# Patient Record
Sex: FEMALE | Race: WHITE | HISPANIC OR LATINO | Employment: FULL TIME | URBAN - METROPOLITAN AREA
[De-identification: names, ages, dates, MRNs, and addresses within clinical notes are randomized per-mention and may not be internally consistent; named-entity substitution may affect disease eponyms.]

---

## 2023-01-30 ENCOUNTER — TELEPHONE (OUTPATIENT)
Dept: OBGYN CLINIC | Facility: CLINIC | Age: 23
End: 2023-01-30

## 2023-01-30 DIAGNOSIS — R73.01 ELEVATED FASTING GLUCOSE: Primary | ICD-10-CM

## 2023-01-30 NOTE — TELEPHONE ENCOUNTER
Spoke with pt in regards to her abnormal 2 HR glucose results and advised she needs to follow up with her PCP

## 2023-05-09 ENCOUNTER — APPOINTMENT (OUTPATIENT)
Dept: LAB | Facility: CLINIC | Age: 23
End: 2023-05-09

## 2023-05-09 DIAGNOSIS — Z77.21 PERSONAL HISTORY OF EXPOSURE TO POTENTIALLY HAZARDOUS BODY FLUIDS: ICD-10-CM

## 2023-05-09 DIAGNOSIS — Z01.84 IMMUNITY STATUS TESTING: ICD-10-CM

## 2023-05-09 LAB
HBV SURFACE AB SER-ACNC: 14.3 MIU/ML
HCV AB SER QL: NORMAL
HIV 1+2 AB+HIV1 P24 AG SERPL QL IA: NORMAL
HIV 2 AB SERPL QL IA: NORMAL
HIV1 AB SERPL QL IA: NORMAL
HIV1 P24 AG SERPL QL IA: NORMAL

## 2023-06-20 ENCOUNTER — APPOINTMENT (OUTPATIENT)
Dept: LAB | Facility: CLINIC | Age: 23
End: 2023-06-20

## 2023-06-20 ENCOUNTER — TRANSCRIBE ORDERS (OUTPATIENT)
Dept: LAB | Facility: CLINIC | Age: 23
End: 2023-06-20

## 2023-06-20 DIAGNOSIS — Z00.8 HEALTH EXAMINATION IN POPULATION SURVEY: ICD-10-CM

## 2023-06-20 DIAGNOSIS — Z00.8 HEALTH EXAMINATION IN POPULATION SURVEY: Primary | ICD-10-CM

## 2023-06-20 LAB
CHOLEST SERPL-MCNC: 140 MG/DL
HDLC SERPL-MCNC: 51 MG/DL
LDLC SERPL CALC-MCNC: 59 MG/DL (ref 0–100)
NONHDLC SERPL-MCNC: 89 MG/DL
TRIGL SERPL-MCNC: 152 MG/DL

## 2023-06-20 PROCEDURE — 36415 COLL VENOUS BLD VENIPUNCTURE: CPT

## 2023-06-20 PROCEDURE — 80061 LIPID PANEL: CPT

## 2023-06-20 PROCEDURE — 83036 HEMOGLOBIN GLYCOSYLATED A1C: CPT

## 2023-06-21 LAB
EST. AVERAGE GLUCOSE BLD GHB EST-MCNC: 111 MG/DL
HBA1C MFR BLD: 5.5 %

## 2023-08-01 DIAGNOSIS — T75.3XXA MOTION SICKNESS, INITIAL ENCOUNTER: Primary | ICD-10-CM

## 2023-08-02 DIAGNOSIS — T75.3XXA MOTION SICKNESS, INITIAL ENCOUNTER: Primary | ICD-10-CM

## 2023-08-02 RX ORDER — DIMENHYDRINATE 50 MG
50 TABLET ORAL DAILY PRN
Qty: 30 TABLET | Refills: 2 | Status: SHIPPED | OUTPATIENT
Start: 2023-08-02

## 2023-08-07 ENCOUNTER — DOCUMENTATION (OUTPATIENT)
Dept: DERMATOLOGY | Facility: CLINIC | Age: 23
End: 2023-08-07

## 2023-08-07 DIAGNOSIS — L30.9 ECZEMA, UNSPECIFIED TYPE: Primary | ICD-10-CM

## 2023-08-07 RX ORDER — TRIAMCINOLONE ACETONIDE 1 MG/G
OINTMENT TOPICAL 2 TIMES DAILY
Qty: 80 G | Refills: 0 | Status: SHIPPED | OUTPATIENT
Start: 2023-08-07

## 2023-12-23 ENCOUNTER — OFFICE VISIT (OUTPATIENT)
Dept: URGENT CARE | Age: 23
End: 2023-12-23
Payer: COMMERCIAL

## 2023-12-23 VITALS
OXYGEN SATURATION: 98 % | SYSTOLIC BLOOD PRESSURE: 108 MMHG | TEMPERATURE: 97.2 F | HEART RATE: 76 BPM | DIASTOLIC BLOOD PRESSURE: 66 MMHG | RESPIRATION RATE: 18 BRPM

## 2023-12-23 DIAGNOSIS — B37.31 VAGINAL CANDIDIASIS: Primary | ICD-10-CM

## 2023-12-23 LAB — SL AMB POCT URINE HCG: NORMAL

## 2023-12-23 PROCEDURE — 99213 OFFICE O/P EST LOW 20 MIN: CPT

## 2023-12-23 PROCEDURE — 81025 URINE PREGNANCY TEST: CPT

## 2023-12-23 RX ORDER — FLUCONAZOLE 150 MG/1
150 TABLET ORAL ONCE
Qty: 1 TABLET | Refills: 0 | Status: SHIPPED | OUTPATIENT
Start: 2023-12-23 | End: 2023-12-23

## 2023-12-23 NOTE — PROGRESS NOTES
[facility]        NAME: Joselito Marlow is a 23 y.o. female  : 2000    MRN: [id number]  DATE: 2023  TIME: 12:19 PM    Assessment and Plan   Vaginal candidiasis [B37.31]  1. Vaginal candidiasis  fluconazole (DIFLUCAN) 150 mg tablet    POCT urine HCG            Patient Instructions     Please take Diflucan as ordered.  If symptoms persist after use of Diflucan, please follow-up with GYN for further evaluation.  Follow up with PCP in 3-5 days.  Proceed to  ER if symptoms worsen.    Chief Complaint     Chief Complaint   Patient presents with    Vaginitis     Patient states that she started on Wednesday with a yeast infection and Thursday she started with Monstat which is not helping at this time         History of Present Illness       23-year-old female presenting for evaluation of vaginal discharge and irritation.  Patient states over the past 4 days she has been experiencing white, thick discharge, vaginal itching, redness and swelling.  She denies any change in sexual partners.  She denies concern for pregnancy at this time.  Patient denies any malodorous discharge, burning with urination, frequency, urgency, nausea, vomiting or diarrhea.  Has been using Monistat with mild relief of her symptoms.        Review of Systems   Review of Systems   Constitutional:  Negative for chills and fever.   Respiratory:  Negative for shortness of breath.    Cardiovascular:  Negative for chest pain.   Gastrointestinal:  Negative for constipation, diarrhea, nausea and vomiting.   Genitourinary:  Positive for vaginal discharge. Negative for dysuria, frequency, hematuria, urgency and vaginal bleeding.   All other systems reviewed and are negative.        Current Medications       Current Outpatient Medications:     fluconazole (DIFLUCAN) 150 mg tablet, Take 1 tablet (150 mg total) by mouth once for 1 dose, Disp: 1 tablet, Rfl: 0    dimenhyDRINATE (DRAMAMINE) 50 mg tablet, Take 1 tablet (50 mg total)  by mouth daily as needed for movement disorders, Disp: 30 tablet, Rfl: 2    Prenat-FeAsp-Meth-FA-DHA w/o A (Prenate Essential) 18-0.6-0.4-300 MG CAPS, Take 1 tablet by mouth in the morning, Disp: 30 capsule, Rfl: 11    triamcinolone (KENALOG) 0.1 % ointment, Apply topically 2 (two) times a day To the affected areas for up to 14 days. Do not apply to the face, armpits, or groin., Disp: 80 g, Rfl: 0    Current Allergies     Allergies as of 12/23/2023    (No Known Allergies)            The following portions of the patient's history were reviewed and updated as appropriate: allergies, current medications, past family history, past medical history, past social history, past surgical history and problem list.     Past Medical History:   Diagnosis Date    Diet controlled gestational diabetes mellitus (GDM) in third trimester 06/16/2022    History of intrauterine growth restriction in prior pregnancy, currently pregnant, first trimester 1/19/2022    Weighed 5 pounds at 35 weeks and 6 days    Hypertension 7/4/2018    PCOS (polycystic ovarian syndrome)     Preeclampsia, severe, third trimester 07/04/2018    with this pregnancy    Preeclampsia, third trimester 6/16/2022    Varicella     had vaccine       Past Surgical History:   Procedure Laterality Date    WISDOM TOOTH EXTRACTION Bilateral 09/07/2021       Family History   Problem Relation Age of Onset    Cancer Mother         cancerous cyst removed from LLQ - s/p hysterectomy    Asthma Mother     Sleep apnea Father     No Known Problems Sister     No Known Problems Brother     Seizures Son     Diabetes Maternal Grandmother          Medications have been verified.        Objective   /66 (BP Location: Right arm, Patient Position: Sitting, Cuff Size: Standard)   Pulse 76   Temp (!) 97.2 °F (36.2 °C)   Resp 18   SpO2 98%        Physical Exam     Physical Exam  Vitals and nursing note reviewed.   Constitutional:       General: She is not in acute distress.      Appearance: Normal appearance. She is normal weight. She is not ill-appearing, toxic-appearing or diaphoretic.   HENT:      Head: Normocephalic and atraumatic.      Right Ear: Tympanic membrane normal.      Left Ear: Tympanic membrane normal.   Eyes:      General:         Right eye: No discharge.         Left eye: No discharge.   Cardiovascular:      Rate and Rhythm: Normal rate and regular rhythm.      Pulses: Normal pulses.      Heart sounds: Normal heart sounds. No murmur heard.     No friction rub. No gallop.   Pulmonary:      Effort: Pulmonary effort is normal. No respiratory distress.      Breath sounds: Normal breath sounds. No stridor. No wheezing, rhonchi or rales.   Chest:      Chest wall: No tenderness.   Abdominal:      General: Bowel sounds are normal.      Palpations: Abdomen is soft.      Tenderness: There is no abdominal tenderness.   Skin:     General: Skin is warm and dry.   Neurological:      Mental Status: She is alert.   Psychiatric:         Mood and Affect: Mood normal.         Behavior: Behavior normal.

## 2023-12-23 NOTE — PATIENT INSTRUCTIONS
Please take Diflucan as ordered.  If symptoms persist after use of Diflucan, please follow-up with GYN for further evaluation.

## 2023-12-26 ENCOUNTER — TELEPHONE (OUTPATIENT)
Dept: OBGYN CLINIC | Facility: CLINIC | Age: 23
End: 2023-12-26

## 2023-12-26 NOTE — TELEPHONE ENCOUNTER
Patient called, left message on answering machine, she was seen at urgent care for a yeast infection, treated x 1 dose of Diflucan but no improvement. Starting to bleed, patient not sure is its from the medication or menses or PCOS related?

## 2023-12-26 NOTE — TELEPHONE ENCOUNTER
Placed call to patient, scheduled for 12/29/23 with Elizabeth.  Patient has tried Monistat 1 day and diflucan without relief. Patient works at a doctors office, urine dipped and negative for a UTI. C/O vaginal discharge and irritation, white thick discharge, vaginal itching, redness and swelling.

## 2023-12-28 NOTE — PROGRESS NOTES
Assessment/Plan:       Problem List Items Addressed This Visit       PCOS (polycystic ovarian syndrome) - Primary       No genital testing collected today since patient is currently asymptomatic and possible skewed results with active menses.  Reviewed PCOS and reassured patient that bleeding irregularities is common in PCOS. Discussed with her that 24-day cycle is actually common for women without PCOS. However, the yeast infection and changes in medication may have disrupted her HPO axis and caused vaginal spotting with earlier menses for this month.   3. Advised patient to monitor her bleeding. If bleeding continues or worsens after CD7-9, to call office for management. Consideration can be Provera, Aygestin, or TXA to mitigate bleeding. Reassured patient that bleeding duration can change in each cycle and can be longer than 4 days.   4. Counseled patient on management of menstrual irregularity r/t PCOS including BC methods, metformin, and supplements (herb-inositol). She is not interested in BC due to changes in mood, but will consider supplements.  5. Reviewed yeast prevention: Recommend acidophilus or yogurt daily, avoid irritating soaps, lubricants, or lotions, no tight fitted pants or underwear, avoid bubble baths, do not stay in wet swimsuit/moist clothing.   6. Encouraged organic coconut oil for future vulvar irritation/itching.    Subjective:      Patient ID: Joselito Marlow is a 23 y.o. female.    Patient presents to the office with vaginal and bleeding concerns. She reports she started with yeast infection symptoms of abnormal discharge and vulvar irritation on 12/20 and treated symptoms with 1-day Monistat on 12/21. She was then seen at Urgent Care on 12/23 because symptoms did not fully resolve. She was given Diflucan. After taking the medication, she reports she started with vaginal spotting. LMP prior to spotting was 12/01. On 12/25, she started a full flow. Bleeding reported as light to  moderate. She changes pads 3x/day. She reports that she still has her period on CD4 with normal flow and this is not typical for her because her bleeding usually only last 3-4 days. Prior to the irregular spotting and flow this month, her periods are irregular with occasional skipped months.  She reports that prior to vaginal spotting, her periods are irregular with occasional skipped months but never more than 3 months in a row.     She denies any continued symptoms of vaginitis with itching, burning, discharge, odor. She is sexually active. Most recent encounter approximately 1 month ago; with one partner x 6 years.     GYN history significant for PCOS.        The following portions of the patient's history were reviewed and updated as appropriate: She  has a past medical history of Diet controlled gestational diabetes mellitus (GDM) in third trimester (06/16/2022), History of intrauterine growth restriction in prior pregnancy, currently pregnant, first trimester (1/19/2022), Hypertension (7/4/2018), PCOS (polycystic ovarian syndrome), Preeclampsia, severe, third trimester (07/04/2018), Preeclampsia, third trimester (6/16/2022), and Varicella.  She   Patient Active Problem List    Diagnosis Date Noted    Daily headache 11/25/2022    PCOS (polycystic ovarian syndrome) 01/09/2020     She  has a past surgical history that includes [place] tooth extraction (Bilateral, 09/07/2021).  Her family history includes Asthma in her mother; Breast cancer in her maternal grandmother; Cancer in her mother; Diabetes in her maternal grandmother; No Known Problems in her brother and sister; Ovarian cancer in her mother; Seizures in her sister, son, and son; Sleep apnea in her father.  She  reports that she has never smoked. She has never used smokeless tobacco. She reports current alcohol use. She reports that she does not use drugs.  Current Outpatient Medications   Medication Sig Dispense Refill    dimenhyDRINATE (DRAMAMINE) 50 mg  "tablet Take 1 tablet (50 mg total) by mouth daily as needed for movement disorders 30 tablet 2    triamcinolone (KENALOG) 0.1 % ointment Apply topically 2 (two) times a day To the affected areas for up to 14 days. Do not apply to the face, armpits, or groin. 80 g 0    Prenat-FeAsp-Meth-FA-DHA w/o A (Prenate Essential) 18-0.6-0.4-300 MG CAPS Take 1 tablet by mouth in the morning 30 capsule 11     No current facility-administered medications for this visit.     Current Outpatient Medications on File Prior to Visit   Medication Sig    dimenhyDRINATE (DRAMAMINE) 50 mg tablet Take 1 tablet (50 mg total) by mouth daily as needed for movement disorders    triamcinolone (KENALOG) 0.1 % ointment Apply topically 2 (two) times a day To the affected areas for up to 14 days. Do not apply to the face, armpits, or groin.    Prenat-FeAsp-Meth-FA-DHA w/o A (Prenate Essential) 18-0.6-0.4-300 MG CAPS Take 1 tablet by mouth in the morning     No current facility-administered medications on file prior to visit.     She has No Known Allergies..    Review of Systems   Constitutional:  Negative for chills and fever.   Gastrointestinal:  Negative for abdominal pain.   Genitourinary:  Positive for menstrual problem and vaginal bleeding. Negative for dyspareunia, dysuria, flank pain, pelvic pain, vaginal discharge and vaginal pain.         Objective:      /62 (BP Location: Left arm, Patient Position: Sitting, Cuff Size: Standard)   Ht 5' 1.5\" (1.562 m)   Wt 63.1 kg (139 lb 3.2 oz)   LMP 12/01/2023 (Exact Date)   BMI 25.88 kg/m²          Physical Exam  Vitals and nursing note reviewed.   Constitutional:       Appearance: Normal appearance.   HENT:      Head: Normocephalic.      Mouth/Throat:      Pharynx: Oropharynx is clear.   Eyes:      Conjunctiva/sclera: Conjunctivae normal.   Pulmonary:      Effort: Pulmonary effort is normal. No respiratory distress.   Abdominal:      General: Abdomen is flat.      Palpations: Abdomen is soft. "   Genitourinary:     General: Normal vulva.      Exam position: Lithotomy position.      Pubic Area: No rash or pubic lice.       Labia:         Right: No rash or tenderness.         Left: No rash or tenderness.       Urethra: No urethral pain.      Vagina: Bleeding present.      Cervix: Normal.      Uterus: Normal.       Adnexa: Right adnexa normal and left adnexa normal.      Comments: Active moderate menses.  Musculoskeletal:         General: Normal range of motion.      Cervical back: Normal range of motion.   Lymphadenopathy:      Lower Body: No right inguinal adenopathy. No left inguinal adenopathy.   Skin:     General: Skin is warm and dry.   Neurological:      Mental Status: She is alert and oriented to person, place, and time.   Psychiatric:         Mood and Affect: Mood normal.         Behavior: Behavior normal.         Thought Content: Thought content normal.         Judgment: Judgment normal.

## 2023-12-29 ENCOUNTER — OFFICE VISIT (OUTPATIENT)
Dept: OBGYN CLINIC | Facility: CLINIC | Age: 23
End: 2023-12-29
Payer: COMMERCIAL

## 2023-12-29 VITALS
BODY MASS INDEX: 25.62 KG/M2 | SYSTOLIC BLOOD PRESSURE: 106 MMHG | HEIGHT: 62 IN | DIASTOLIC BLOOD PRESSURE: 62 MMHG | WEIGHT: 139.2 LBS

## 2023-12-29 DIAGNOSIS — E28.2 PCOS (POLYCYSTIC OVARIAN SYNDROME): Primary | ICD-10-CM

## 2023-12-29 PROCEDURE — 99213 OFFICE O/P EST LOW 20 MIN: CPT

## 2024-03-05 DIAGNOSIS — B96.89 ACUTE BACTERIAL PHARYNGITIS: Primary | ICD-10-CM

## 2024-03-05 DIAGNOSIS — J02.8 ACUTE BACTERIAL PHARYNGITIS: Primary | ICD-10-CM

## 2024-03-05 RX ORDER — AMOXICILLIN 875 MG/1
875 TABLET, COATED ORAL 2 TIMES DAILY
Qty: 10 TABLET | Refills: 0 | Status: SHIPPED | OUTPATIENT
Start: 2024-03-05 | End: 2024-03-10

## 2024-04-04 ENCOUNTER — OFFICE VISIT (OUTPATIENT)
Dept: FAMILY MEDICINE CLINIC | Facility: CLINIC | Age: 24
End: 2024-04-04
Payer: COMMERCIAL

## 2024-04-04 VITALS
RESPIRATION RATE: 15 BRPM | WEIGHT: 140.6 LBS | BODY MASS INDEX: 25.88 KG/M2 | HEIGHT: 62 IN | DIASTOLIC BLOOD PRESSURE: 70 MMHG | TEMPERATURE: 98.3 F | SYSTOLIC BLOOD PRESSURE: 108 MMHG | OXYGEN SATURATION: 99 % | HEART RATE: 78 BPM

## 2024-04-04 DIAGNOSIS — Z00.00 ANNUAL PHYSICAL EXAM: Primary | ICD-10-CM

## 2024-04-04 PROCEDURE — 99395 PREV VISIT EST AGE 18-39: CPT | Performed by: STUDENT IN AN ORGANIZED HEALTH CARE EDUCATION/TRAINING PROGRAM

## 2024-04-04 NOTE — PROGRESS NOTES
ADULT ANNUAL PHYSICAL  [facility]    NAME: Joselito Marlow  AGE: 24 y.o. SEX: female  : 2000     DATE: 2024     Assessment and Plan:     Problem List Items Addressed This Visit    None  Visit Diagnoses       Annual physical exam    -  Primary    Relevant Orders    Lipid panel    CBC and Platelet    Hemoglobin A1C    TSH, 3rd generation with Free T4 reflex    Vitamin D 25 hydroxy    Comprehensive metabolic panel              Immunizations and preventive care screenings were discussed with patient today. Appropriate education was printed on patient's after visit summary.    Counseling:  Exercise: the importance of regular exercise/physical activity was discussed. Recommend exercise 3-5 times per week for at least 30 minutes.       Depression Screening and Follow-up Plan: Patient's depression screening was positive with a PHQ-2 score of 4. Their PHQ-9 score was 11. Patient declines further evaluation by mental health professional and/or medications. Brief counseling provided. Will re-evaluate at next office visit.       Return in about 1 year (around 2025) for Annual physical- 30 mins.     Chief Complaint:     Chief Complaint   Patient presents with    [facility]    Physical Exam      History of Present Illness:     Adult Annual Physical   Patient here for a comprehensive physical exam. The patient reports no problems.    Diet and Physical Activity  Diet/Nutrition: well balanced diet.   Exercise: walking.      Depression Screening  PHQ-2/9 Depression Screening    Little interest or pleasure in doing things: 3 - nearly every day  Feeling down, depressed, or hopeless: 1 - several days  Trouble falling or staying asleep, or sleeping too much: 0 - not at all  Feeling tired or having little energy: 2 - more than half the days  Poor appetite or overeatin - more than half the days  Feeling bad about yourself - or that  you are a failure or have let yourself or your family down: 3 - nearly every day  Trouble concentrating on things, such as reading the newspaper or watching television: 0 - not at all  Moving or speaking so slowly that other people could have noticed. Or the opposite - being so fidgety or restless that you have been moving around a lot more than usual: 0 - not at all  Thoughts that you would be better off dead, or of hurting yourself in some way: 0 - not at all  PHQ-2 Score: 4  PHQ-2 Interpretation: POSITIVE depression screen  PHQ-9 Score: 11  PHQ-9 Interpretation: Moderate depression       General Health  Sleep: sleeps well.   Hearing: normal - bilateral.  Vision: goes for regular eye exams and wears glasses.   Dental: regular dental visits.       /GYN Health  Follows with gynecology? yes   Last menstrual period: 1/2024  Contraceptive method:  none .  History of STDs?: no.         Review of Systems:     Review of Systems   Constitutional:  Negative for appetite change and fatigue.   HENT:  Negative for congestion.    Respiratory:  Negative for cough, chest tightness and shortness of breath.    Cardiovascular:  Negative for chest pain.   Gastrointestinal:  Negative for abdominal pain.   Genitourinary:  Negative for dysuria.   Neurological:  Negative for dizziness, light-headedness and headaches.   Psychiatric/Behavioral:  Negative for self-injury and suicidal ideas.       Past Medical History:     Past Medical History:   Diagnosis Date    Anxiety     Diet controlled gestational diabetes mellitus (GDM) in third trimester 06/16/2022    History of intrauterine growth restriction in prior pregnancy, currently pregnant, first trimester 01/19/2022    Weighed 5 pounds at 35 weeks and 6 days    Hypertension 07/04/2018    PCOS (polycystic ovarian syndrome)     Preeclampsia, severe, third trimester 07/04/2018    with this pregnancy    Preeclampsia, third trimester 06/16/2022    Varicella     had vaccine      Past Surgical  History:     Past Surgical History:   Procedure Laterality Date    MOLE REMOVAL Left     arm    WISDOM TOOTH EXTRACTION Bilateral 09/07/2021      Social History:     Social History     Socioeconomic History    Marital status: Single     Spouse name: None    Number of children: None    Years of education: None    Highest education level: None   Occupational History    None   Tobacco Use    Smoking status: Never    Smokeless tobacco: Never   Vaping Use    Vaping status: Never Used   Substance and Sexual Activity    Alcohol use: Not Currently    Drug use: No    Sexual activity: Yes     Partners: Male     Birth control/protection: None   Other Topics Concern    None   Social History Narrative    None     Social Determinants of Health     Financial Resource Strain: Not on file   Food Insecurity: Not on file   Transportation Needs: Not on file   Physical Activity: Not on file   Stress: Not on file   Social Connections: Not on file   Intimate Partner Violence: Not on file   Housing Stability: Not on file      Family History:     Family History   Problem Relation Age of Onset    Cancer Mother         ovarian    Asthma Mother     Ovarian cancer Mother     Diabetes Father     Sleep apnea Father     Seizures Sister         2022    ADD / ADHD Brother     Anxiety disorder Brother     Self-Injury Brother     Suicide Attempts Brother     Seizures Son     Seizures Son         9/8/22    Diabetes Maternal Grandmother     Breast cancer Maternal Grandmother         2023    Alcohol abuse Paternal Grandfather     Substance Abuse Maternal Uncle     Substance Abuse Maternal Uncle     Alcohol abuse Paternal Uncle       Current Medications:     Current Outpatient Medications   Medication Sig Dispense Refill    dimenhyDRINATE (DRAMAMINE) 50 mg tablet Take 1 tablet (50 mg total) by mouth daily as needed for movement disorders 30 tablet 2    triamcinolone (KENALOG) 0.1 % ointment Apply topically 2 (two) times a day To the affected areas for up  "to 14 days. Do not apply to the face, armpits, or groin. 80 g 0     No current facility-administered medications for this visit.      Allergies:     Allergies   Allergen Reactions    Medical Tape Itching, Rash and Swelling      Physical Exam:     /70   Pulse 78   Temp 98.3 °F (36.8 °C) (Oral)   Resp 15   Ht 5' 1.5\" (1.562 m)   Wt 63.8 kg (140 lb 9.6 oz)   SpO2 99%   BMI 26.14 kg/m²     Physical Exam  Vitals reviewed.   Constitutional:       General: She is not in acute distress.  HENT:      Head: Normocephalic and atraumatic.      Right Ear: Tympanic membrane, ear canal and external ear normal.      Left Ear: Tympanic membrane, ear canal and external ear normal.      Nose: Nose normal.      Mouth/Throat:      Mouth: Mucous membranes are moist.      Pharynx: Oropharynx is clear.   Eyes:      Extraocular Movements: Extraocular movements intact.   Cardiovascular:      Rate and Rhythm: Normal rate and regular rhythm.      Pulses: Normal pulses.      Heart sounds: Normal heart sounds.   Pulmonary:      Effort: Pulmonary effort is normal.      Breath sounds: Normal breath sounds.   Abdominal:      Palpations: Abdomen is soft.      Tenderness: There is no abdominal tenderness.   Musculoskeletal:      Right lower leg: No edema.      Left lower leg: No edema.   Neurological:      General: No focal deficit present.      Mental Status: She is alert.   Psychiatric:         Mood and Affect: Mood normal.          Zena Jackson MD   [facility]    "

## 2024-04-21 ENCOUNTER — APPOINTMENT (OUTPATIENT)
Dept: LAB | Facility: CLINIC | Age: 24
End: 2024-04-21
Payer: COMMERCIAL

## 2024-04-21 DIAGNOSIS — Z00.00 ANNUAL PHYSICAL EXAM: ICD-10-CM

## 2024-04-21 LAB
25(OH)D3 SERPL-MCNC: 13 NG/ML (ref 30–100)
ALBUMIN SERPL BCP-MCNC: 4.3 G/DL (ref 3.5–5)
ALP SERPL-CCNC: 45 U/L (ref 34–104)
ALT SERPL W P-5'-P-CCNC: 14 U/L (ref 7–52)
ANION GAP SERPL CALCULATED.3IONS-SCNC: 3 MMOL/L (ref 4–13)
AST SERPL W P-5'-P-CCNC: 15 U/L (ref 13–39)
BILIRUB SERPL-MCNC: 0.61 MG/DL (ref 0.2–1)
BUN SERPL-MCNC: 7 MG/DL (ref 5–25)
CALCIUM SERPL-MCNC: 9.2 MG/DL (ref 8.4–10.2)
CHLORIDE SERPL-SCNC: 105 MMOL/L (ref 96–108)
CHOLEST SERPL-MCNC: 128 MG/DL
CO2 SERPL-SCNC: 31 MMOL/L (ref 21–32)
CREAT SERPL-MCNC: 0.71 MG/DL (ref 0.6–1.3)
ERYTHROCYTE [DISTWIDTH] IN BLOOD BY AUTOMATED COUNT: 11.6 % (ref 11.6–15.1)
EST. AVERAGE GLUCOSE BLD GHB EST-MCNC: 108 MG/DL
GFR SERPL CREATININE-BSD FRML MDRD: 119 ML/MIN/1.73SQ M
GLUCOSE P FAST SERPL-MCNC: 92 MG/DL (ref 65–99)
HBA1C MFR BLD: 5.4 %
HCT VFR BLD AUTO: 42.7 % (ref 34.8–46.1)
HDLC SERPL-MCNC: 51 MG/DL
HGB BLD-MCNC: 14 G/DL (ref 11.5–15.4)
LDLC SERPL CALC-MCNC: 66 MG/DL (ref 0–100)
MCH RBC QN AUTO: 30 PG (ref 26.8–34.3)
MCHC RBC AUTO-ENTMCNC: 32.8 G/DL (ref 31.4–37.4)
MCV RBC AUTO: 91 FL (ref 82–98)
NONHDLC SERPL-MCNC: 77 MG/DL
PLATELET # BLD AUTO: 332 THOUSANDS/UL (ref 149–390)
PMV BLD AUTO: 9.4 FL (ref 8.9–12.7)
POTASSIUM SERPL-SCNC: 4 MMOL/L (ref 3.5–5.3)
PROT SERPL-MCNC: 7.1 G/DL (ref 6.4–8.4)
RBC # BLD AUTO: 4.67 MILLION/UL (ref 3.81–5.12)
SODIUM SERPL-SCNC: 139 MMOL/L (ref 135–147)
TRIGL SERPL-MCNC: 57 MG/DL
TSH SERPL DL<=0.05 MIU/L-ACNC: 0.67 UIU/ML (ref 0.45–4.5)
WBC # BLD AUTO: 6.4 THOUSAND/UL (ref 4.31–10.16)

## 2024-04-21 PROCEDURE — 85027 COMPLETE CBC AUTOMATED: CPT

## 2024-04-21 PROCEDURE — 84443 ASSAY THYROID STIM HORMONE: CPT

## 2024-04-21 PROCEDURE — 82306 VITAMIN D 25 HYDROXY: CPT

## 2024-04-21 PROCEDURE — 83036 HEMOGLOBIN GLYCOSYLATED A1C: CPT

## 2024-04-21 PROCEDURE — 80053 COMPREHEN METABOLIC PANEL: CPT

## 2024-04-21 PROCEDURE — 80061 LIPID PANEL: CPT

## 2024-04-21 PROCEDURE — 36415 COLL VENOUS BLD VENIPUNCTURE: CPT

## 2024-04-23 DIAGNOSIS — E55.9 VITAMIN D DEFICIENCY: Primary | ICD-10-CM

## 2024-04-23 RX ORDER — ERGOCALCIFEROL 1.25 MG/1
50000 CAPSULE ORAL WEEKLY
Qty: 4 CAPSULE | Refills: 2 | Status: SHIPPED | OUTPATIENT
Start: 2024-04-23 | End: 2024-07-22

## 2024-05-07 ENCOUNTER — OCCMED (OUTPATIENT)
Dept: URGENT CARE | Age: 24
End: 2024-05-07
Payer: OTHER MISCELLANEOUS

## 2024-05-07 DIAGNOSIS — Z77.21 EXPOSURE TO BODY FLUIDS BY CONTAMINATED HYPODERMIC NEEDLE STICK: Primary | ICD-10-CM
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
## 2024-05-07 LAB — ALT SERPL W P-5'-P-CCNC: 14 U/L (ref 7–52)

## 2024-05-07 PROCEDURE — 86706 HEP B SURFACE ANTIBODY: CPT

## 2024-05-07 PROCEDURE — 99285 EMERGENCY DEPT VISIT HI MDM: CPT

## 2024-05-07 PROCEDURE — 86803 HEPATITIS C AB TEST: CPT

## 2024-05-07 PROCEDURE — 84460 ALANINE AMINO (ALT) (SGPT): CPT

## 2024-05-07 PROCEDURE — 86704 HEP B CORE ANTIBODY TOTAL: CPT

## 2024-05-07 PROCEDURE — 87340 HEPATITIS B SURFACE AG IA: CPT

## 2024-05-07 PROCEDURE — G0384 LEV 5 HOSP TYPE B ED VISIT: HCPCS

## 2024-05-07 PROCEDURE — 87389 HIV-1 AG W/HIV-1&-2 AB AG IA: CPT

## 2024-05-08 LAB
HBV CORE AB SER QL: NORMAL
HBV SURFACE AB SER-ACNC: 6.56 MIU/ML
HBV SURFACE AG SER QL: NORMAL
HCV AB SER QL: NORMAL
HIV 1+2 AB+HIV1 P24 AG SERPL QL IA: NORMAL
HIV 2 AB SERPL QL IA: NORMAL
HIV1 AB SERPL QL IA: NORMAL
HIV1 P24 AG SERPL QL IA: NORMAL

## 2024-05-09 DIAGNOSIS — R53.83 TIREDNESS: Primary | ICD-10-CM

## 2024-05-10 DIAGNOSIS — Z77.21 EXPOSURE TO BLOODBORNE PATHOGEN: Primary | ICD-10-CM

## 2024-05-21 ENCOUNTER — APPOINTMENT (OUTPATIENT)
Dept: LAB | Facility: CLINIC | Age: 24
End: 2024-05-21
Payer: COMMERCIAL

## 2024-05-21 DIAGNOSIS — R53.83 TIREDNESS: ICD-10-CM

## 2024-05-21 LAB
FERRITIN SERPL-MCNC: 33 NG/ML (ref 11–307)
IRON SATN MFR SERPL: 37 % (ref 15–50)
IRON SERPL-MCNC: 112 UG/DL (ref 50–212)
TIBC SERPL-MCNC: 305 UG/DL (ref 250–450)
UIBC SERPL-MCNC: 193 UG/DL (ref 155–355)
VIT B12 SERPL-MCNC: 188 PG/ML (ref 180–914)

## 2024-05-21 PROCEDURE — 36415 COLL VENOUS BLD VENIPUNCTURE: CPT

## 2024-05-21 PROCEDURE — 83540 ASSAY OF IRON: CPT

## 2024-05-21 PROCEDURE — 82607 VITAMIN B-12: CPT

## 2024-05-21 PROCEDURE — 82728 ASSAY OF FERRITIN: CPT

## 2024-05-21 PROCEDURE — 83550 IRON BINDING TEST: CPT

## 2024-06-13 ENCOUNTER — OFFICE VISIT (OUTPATIENT)
Dept: FAMILY MEDICINE CLINIC | Facility: CLINIC | Age: 24
End: 2024-06-13
Payer: COMMERCIAL

## 2024-06-13 VITALS
BODY MASS INDEX: 25.8 KG/M2 | HEART RATE: 93 BPM | DIASTOLIC BLOOD PRESSURE: 78 MMHG | WEIGHT: 140.2 LBS | TEMPERATURE: 97.9 F | OXYGEN SATURATION: 99 % | HEIGHT: 62 IN | RESPIRATION RATE: 16 BRPM | SYSTOLIC BLOOD PRESSURE: 110 MMHG

## 2024-06-13 DIAGNOSIS — F32.A ANXIETY AND DEPRESSION: Primary | ICD-10-CM

## 2024-06-13 DIAGNOSIS — F41.9 ANXIETY AND DEPRESSION: Primary | ICD-10-CM

## 2024-06-13 PROCEDURE — 99213 OFFICE O/P EST LOW 20 MIN: CPT | Performed by: NURSE PRACTITIONER

## 2024-06-13 RX ORDER — SERTRALINE HYDROCHLORIDE 25 MG/1
25 TABLET, FILM COATED ORAL DAILY
Qty: 60 TABLET | Refills: 1 | Status: SHIPPED | OUTPATIENT
Start: 2024-06-13 | End: 2024-10-11

## 2024-06-13 NOTE — PROGRESS NOTES
"Ambulatory Visit  Name: Joselito Marlow      : 2000      MRN: [id number]  Encounter Provider: SOFI Diaz  Encounter Date: 2024   Encounter department: [facility]    Assessment & Plan   1. Anxiety and depression  -     sertraline (ZOLOFT) 25 mg tablet; Take 1 tablet (25 mg total) by mouth daily    Plan to start Sertraline 25 mg daily since patient is medication naive. If she tolerated the medication well without side effects, she may increase to #2 25 mg tablets for total of 50 mg daily. We will have her RTO in 6 weeks for recheck or sooner PRN. Pt encouraged to continue therapy as well. Patient is encouraged to call our office for any questions/concerns, persistent or worsening symptoms. Patient states they understand and agree with treatment plan.       Pt to RTO in 6 weeks for anxiety/depression/medication recheck for 30 minutes or sooner PRN    Depression Screening and Follow-up Plan: Patient's depression screening was positive with a PHQ-2 score of 5. Their PHQ-9 score was 23. Patient assessed for underlying major depression. Brief counseling provided and recommend additional follow-up/re-evaluation next office visit.       History of Present Illness     Pt presents today for worsening anxiety and depression.  She believes she may have had this for some time, however; after talking w/ a therapist, she was told that she indeed has depression.  She denies any thoughts of self harm of SI. No changes in sleep or appetite.  Pt is interested in starting medication.  Pt will ending therapy which was 12 weeks through her work and will be seeing a new therapist on .        Review of Systems  As noted per HPI.    Objective     /78   Pulse 93   Temp 97.9 °F (36.6 °C)   Resp 16   Ht 5' 1.5\" (1.562 m)   Wt 63.6 kg (140 lb 3.2 oz)   LMP 2024 (Approximate)   SpO2 99%   Breastfeeding No   BMI 26.06 kg/m²     Physical " Exam  Vitals reviewed.   Constitutional:       Appearance: Normal appearance.   Pulmonary:      Effort: Pulmonary effort is normal.   Neurological:      Mental Status: She is alert and oriented to person, place, and time. Mental status is at baseline.   Psychiatric:         Attention and Perception: Attention normal.         Mood and Affect: Mood is anxious and depressed. Affect is not tearful.         Speech: Speech normal.         Behavior: Behavior normal. Behavior is cooperative.         Thought Content: Thought content normal. Thought content does not include homicidal or suicidal ideation. Thought content does not include homicidal or suicidal plan.         Judgment: Judgment normal.

## 2024-06-26 ENCOUNTER — APPOINTMENT (OUTPATIENT)
Dept: LAB | Facility: CLINIC | Age: 24
End: 2024-06-26

## 2024-06-26 DIAGNOSIS — Z77.21 EXPOSURE TO BLOODBORNE PATHOGEN: ICD-10-CM

## 2024-06-26 LAB
HIV 1+2 AB+HIV1 P24 AG SERPL QL IA: NORMAL
HIV 2 AB SERPL QL IA: NORMAL
HIV1 AB SERPL QL IA: NORMAL
HIV1 P24 AG SERPL QL IA: NORMAL

## 2024-06-26 PROCEDURE — 36415 COLL VENOUS BLD VENIPUNCTURE: CPT

## 2024-06-26 PROCEDURE — 87389 HIV-1 AG W/HIV-1&-2 AB AG IA: CPT

## 2024-07-19 ENCOUNTER — OFFICE VISIT (OUTPATIENT)
Dept: FAMILY MEDICINE CLINIC | Facility: CLINIC | Age: 24
End: 2024-07-19
Payer: COMMERCIAL

## 2024-07-19 VITALS
RESPIRATION RATE: 16 BRPM | HEIGHT: 62 IN | OXYGEN SATURATION: 98 % | BODY MASS INDEX: 25.58 KG/M2 | SYSTOLIC BLOOD PRESSURE: 116 MMHG | TEMPERATURE: 98 F | DIASTOLIC BLOOD PRESSURE: 82 MMHG | WEIGHT: 139 LBS | HEART RATE: 73 BPM

## 2024-07-19 DIAGNOSIS — F32.A DEPRESSION, UNSPECIFIED DEPRESSION TYPE: Primary | ICD-10-CM

## 2024-07-19 PROCEDURE — 99213 OFFICE O/P EST LOW 20 MIN: CPT | Performed by: STUDENT IN AN ORGANIZED HEALTH CARE EDUCATION/TRAINING PROGRAM

## 2024-07-19 RX ORDER — ESCITALOPRAM OXALATE 10 MG/1
10 TABLET ORAL DAILY
Qty: 30 TABLET | Refills: 1 | Status: SHIPPED | OUTPATIENT
Start: 2024-07-19 | End: 2024-09-17

## 2024-07-19 NOTE — PROGRESS NOTES
Ambulatory Visit  Name: Joselito Marlow      : 2000      MRN: [id number]  Encounter Provider: Zena Jackson MD  Encounter Date: 2024   Encounter department: [facility]    Assessment & Plan   1. Depression, unspecified depression type  -     Ambulatory referral to Psych Services; Future  -     escitalopram (Lexapro) 10 mg tablet; Take 1 tablet (10 mg total) by mouth daily  Depression and anxiety: Patient previously on sertraline 50 mg however patient reports even after increasing from 25-50 feels like it has not improved symptoms.  Will change to Lexapro 10 mg daily discussed with patient in great detail medication can take anywhere from 4 to 6 weeks for full effect.  Also discussed other side effect panel, patient reports understanding  Patient denies SI/HI  Will refer to psychiatry for both med management and therapy as patient would benefit greatly, stat referral placed given patient's depression  Discussed with patient help hotline along with social support within family and return to office or go to ER if any thoughts of hurting self or anyone else    Follow-up in 4 weeks     History of Present Illness     Pablo is a 24-year-old female who presents to the office today to discuss depression and anxiety and medication regimen.  Patient is currently on sertraline and reports has not been working well.  Patient states continues to feel anxious and depressed.  Has increased personal stressors including son with seizure activity which she is having to go see several doctors for.  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  Patient states she feels like burden is on shoulders.  Worse symptoms in late afternoon evening.  Denies SI HI.  Patient willing to see specialist and med change.      Review of Systems   Constitutional:  Negative for appetite change and fatigue.   HENT:  Negative for congestion.     Respiratory:  Negative for chest tightness.    Cardiovascular:  Negative for chest pain.   Neurological:  Negative for dizziness, light-headedness and headaches.   Psychiatric/Behavioral:  Positive for dysphoric mood. Negative for sleep disturbance and suicidal ideas. The patient is nervous/anxious.      Past Medical History   Past Medical History:   Diagnosis Date    Anxiety     Diet controlled gestational diabetes mellitus (GDM) in third trimester 06/16/2022    History of intrauterine growth restriction in prior pregnancy, currently pregnant, first trimester 01/19/2022    Weighed 5 pounds at 35 weeks and 6 days    Hypertension 07/04/2018    PCOS (polycystic ovarian syndrome)     Preeclampsia, severe, third trimester 07/04/2018    with this pregnancy    Preeclampsia, third trimester 06/16/2022    Varicella     had vaccine     Past Surgical History:   Procedure Laterality Date    MOLE REMOVAL Left     arm    WISDOM TOOTH EXTRACTION Bilateral 09/07/2021     Family History   Problem Relation Age of Onset    Cancer Mother         ovarian    Asthma Mother     Ovarian cancer Mother     Diabetes Father     Sleep apnea Father     Seizures Sister         2022    ADD / ADHD Brother     Anxiety disorder Brother     Self-Injury Brother     Suicide Attempts Brother     Seizures Son     Seizures Son         9/8/22    Diabetes Maternal Grandmother     Breast cancer Maternal Grandmother         2023    Alcohol abuse Paternal Grandfather     Substance Abuse Maternal Uncle     Substance Abuse Maternal Uncle     Alcohol abuse Paternal Uncle      Current Outpatient Medications on File Prior to Visit   Medication Sig Dispense Refill    dimenhyDRINATE (DRAMAMINE) 50 mg tablet Take 1 tablet (50 mg total) by mouth daily as needed for movement disorders 30 tablet 2    Multiple Vitamin (MULTI VITAMIN DAILY PO) Take by mouth      triamcinolone (KENALOG) 0.1 % ointment Apply topically 2 (two) times a day To the affected areas for up to 14  "days. Do not apply to the face, armpits, or groin. 80 g 0    [DISCONTINUED] sertraline (ZOLOFT) 25 mg tablet Take 1 tablet (25 mg total) by mouth daily (Patient taking differently: Take 50 mg by mouth daily) 60 tablet 1    [DISCONTINUED] ergocalciferol (VITAMIN D2) 50,000 units Take 1 capsule (50,000 Units total) by mouth once a week (Patient not taking: Reported on 6/13/2024) 4 capsule 2     No current facility-administered medications on file prior to visit.     Allergies   Allergen Reactions    Medical Tape Itching, Rash and Swelling      Current Outpatient Medications on File Prior to Visit   Medication Sig Dispense Refill    dimenhyDRINATE (DRAMAMINE) 50 mg tablet Take 1 tablet (50 mg total) by mouth daily as needed for movement disorders 30 tablet 2    Multiple Vitamin (MULTI VITAMIN DAILY PO) Take by mouth      triamcinolone (KENALOG) 0.1 % ointment Apply topically 2 (two) times a day To the affected areas for up to 14 days. Do not apply to the face, armpits, or groin. 80 g 0    [DISCONTINUED] sertraline (ZOLOFT) 25 mg tablet Take 1 tablet (25 mg total) by mouth daily (Patient taking differently: Take 50 mg by mouth daily) 60 tablet 1    [DISCONTINUED] ergocalciferol (VITAMIN D2) 50,000 units Take 1 capsule (50,000 Units total) by mouth once a week (Patient not taking: Reported on 6/13/2024) 4 capsule 2     No current facility-administered medications on file prior to visit.      Social History     Tobacco Use    Smoking status: Never    Smokeless tobacco: Never   Vaping Use    Vaping status: Never Used   Substance and Sexual Activity    Alcohol use: Not Currently    Drug use: No    Sexual activity: Yes     Partners: Male     Birth control/protection: None     Objective     /82   Pulse 73   Temp 98 °F (36.7 °C)   Resp 16   Ht 5' 1.5\" (1.562 m)   Wt 63 kg (139 lb)   SpO2 98%   BMI 25.84 kg/m²     Physical Exam  Vitals reviewed.   Eyes:      Extraocular Movements: Extraocular movements intact. "   Cardiovascular:      Rate and Rhythm: Normal rate and regular rhythm.      Pulses: Normal pulses.      Heart sounds: Normal heart sounds.   Pulmonary:      Effort: Pulmonary effort is normal.      Breath sounds: Normal breath sounds.   Neurological:      Mental Status: She is alert.   Psychiatric:         Mood and Affect: Mood is depressed.         Thought Content: Thought content does not include homicidal or suicidal ideation. Thought content does not include homicidal or suicidal plan.       Administrative Statements   I have spent a total time of 30 minutes in caring for this patient on the day of the visit/encounter including Instructions for management, Patient and family education, Importance of tx compliance, Impressions, Documenting in the medical record, Reviewing / ordering tests, medicine, procedures  , and Obtaining or reviewing history  .

## 2024-07-22 ENCOUNTER — TELEPHONE (OUTPATIENT)
Age: 24
End: 2024-07-22

## 2024-07-22 NOTE — TELEPHONE ENCOUNTER
"Behavioral Health Outpatient Intake Questions    Referred By   : PCP    Please advise interviewee that they need to answer all questions truthfully to allow for best care, and any misrepresentations of information may affect their ability to be seen at this clinic   => Was this discussed? Yes     If Minor Child (under age 18)    Who is/are the legal guardian(s) of the child?     Is there a custody agreement?      If \"YES\"- Custody orders must be obtained prior to scheduling the first appointment  In addition, Consent to Treatment must be signed by all legal guardians prior to scheduling the first appointment    If \"NO\"- Consent to Treatment must be signed by all legal guardians prior to scheduling the first appointment    Behavioral Health Outpatient Intake History -     Presenting Problem (in patient's own words): Pt states she has been highly depressed and medications are not working.     Are there any communication barriers for this patient?     No                                               If yes, please describe barriers:   If there is a unique situation, please refer to Chidi Angel/Anabella Adorno for final determination.    Are you taking any psychiatric medications? YESYes     If \"YES\" -What are they Lexapro     If \"YES\" -Who prescribes?     Has the Patient previously received outpatient Talk Therapy or Medication Management from [facility]          If \"YES\"- When, Where and with Whom? no        If \"NO\" -Has Patient received these services elsewhere?       If \"YES\" -When, Where, and with Whom?    Has the Patient abused alcohol or other substances in the last 6 months ? No       If \"YES\" -What substance, How much, How often?     If illegal substance: Refer to [facility] (for JESSICA) or [facility].   If Alcohol in excess of 10 drinks per week:  Refer to [facility] (for JESSICA) or [facility] Off[facility]    Legal History-     Is this treatment court ordered? No   If \"yes \"send to :  Talk Therapy : Send to " "Chidi Adorno for final determination   Med Management: Send to Dr Louis for final determination     Has the Patient been convicted of a felony?  NO   If \"Yes\" send to -When, What?  Talk Therapy: Send to Chidi Adorno for final determination   Med Management: Send to Dr Louis for final determination     ACCEPTED as a patient Yes  If \"Yes\" Appointment Date: 9/9/24 at 1pm    Referred Elsewhere? No  If “Yes” - (Where? Ex: [facility], [facility], [facility], [facility], etc.)       Name of Insurance Co:[facility]  Insurance ID#[id number]  Insurance Phone #[phone]  If ins is primary or secondary?Primary  If patient is a minor, parents information such as Name, D.O.B of guarantor.  "

## 2024-08-01 ENCOUNTER — TELEPHONE (OUTPATIENT)
Age: 24
End: 2024-08-01

## 2024-08-01 ENCOUNTER — HOSPITAL ENCOUNTER (EMERGENCY)
Facility: HOSPITAL | Age: 24
Discharge: HOME | End: 2024-08-01
Attending: EMERGENCY MEDICINE
Payer: COMMERCIAL

## 2024-08-01 VITALS
OXYGEN SATURATION: 100 % | HEART RATE: 76 BPM | DIASTOLIC BLOOD PRESSURE: 71 MMHG | SYSTOLIC BLOOD PRESSURE: 117 MMHG | RESPIRATION RATE: 16 BRPM | TEMPERATURE: 97.8 F

## 2024-08-01 DIAGNOSIS — F32.A DEPRESSION: Primary | ICD-10-CM

## 2024-08-01 PROCEDURE — 99283 EMERGENCY DEPT VISIT LOW MDM: CPT

## 2024-08-01 PROCEDURE — 99284 EMERGENCY DEPT VISIT MOD MDM: CPT | Performed by: EMERGENCY MEDICINE

## 2024-08-01 NOTE — DISCHARGE INSTRUCTIONS
This writer discussed the patients current presentation and recommended discharge plan with Dr. Cornejo & Dr. Vieira.  They agree with the patient being discharged at this time with referrals and/or information about outpatient mental health resources.     The patient was Instructed to follow up with their PCP as needed for medication management.   The patient was provided with referral information for:   Outpatient mental health resources.     This writer and the patient completed a safety plan.  The patient was provided with a copy of their safety plan with encouragement to utilize the plan following discharge.     In addition, the patient was instructed to call [facility] crisis, other crisis services, [facility] or to go to the nearest ER immediately if their situation changes at any time.       This writer discussed discharge plans with the patient, who agrees with and understands the discharge plans.         SAFETY PLAN  Warning Signs (thoughts, images, mood, behavior, situations) of a potential crisis: worsening depression, racing thoughts, suicidal ideations      Coping Skills (what can I do to take my mind off the problem, or to keep myself safe): go for walks, listen to music, take breaks when able to, self-care/spend time alone to recharge      Outside Support (who can I reach out to for support and help): friends and family        National Suicide Prevention Hotline:  988      [facility] [phone] - Crisis   [facility] [phone] - LVF Crisis/Mobile Crisis   [phone] - S[facility] Crisis   [facility]: [phone]  [facility]: [phone]   [facility] [phone] - Crisis   [facility] [phone] - Crisis     [facility] [phone] - Crisis   [facility] [phone] - Crisis   [phone] - Peer Support Talk Line (1-9pm daily)  [phone] - Teen Support Talk Line (1-9pm daily)  [phone] - [facility] [phone]- Crisis    [facility] [phone] -  Crisis   [facility] [phone] - Crisis    [facility]) [phone] - [facility] Crisis

## 2024-08-01 NOTE — TELEPHONE ENCOUNTER
Patient called while having a depressive spiral attack, she was not able to concentrate, piece her thoughts together, emotionally upset, reported mood changes with frustration, to sadness.   We were able to communicate directly with patients primary care provider, who helped guide us through, based on her recommendations, due to patients negative thoughts we advised ED, at first patient declined as she was fearful of repercussions with work. After speaking with patient for 45 mins, we were able to calm patient, determine she was not SI/HI and was not thinking about harming others.   She spoke with her admin, and was given permission to go to ED.   Provider has been updated though out our entire exchange.

## 2024-08-01 NOTE — ED PROVIDER NOTES
History  Chief Complaint   Patient presents with    Depression     Patient presents for depression, recent med changes from certaline to lexapro on 7/23, pcp told her to come to ED, denies SI/HI     Patient is a 24-year-old female with past medical history of pression.  She states that she has been dealing with depression on and off her whole life however it has been acutely worse due to some stressors at home like her son has seizures and she does not like her job and her doctor has not been helpful with her depression medication.  She states that she took sertraline and upped it and then stopped and recently started Lexapro but this has been making feel her more anxious and worse.  She has a psych appointment on the ninth but feels like this is too much of her weight.  She feels lack of motivation and slightly withdrawn.  She appears sad on exam.  She states that she is getting anxious and would like to go home.  Denies SI/HI.  Denies any acute intoxication or ingestion.      Depression  Presenting symptoms: no hallucinations    Associated symptoms: no abdominal pain and no chest pain        Prior to Admission Medications   Prescriptions Last Dose Informant Patient Reported? Taking?   Multiple Vitamin (MULTI VITAMIN DAILY PO)   Yes No   Sig: Take by mouth   dimenhyDRINATE (DRAMAMINE) 50 mg tablet   No No   Sig: Take 1 tablet (50 mg total) by mouth daily as needed for movement disorders   escitalopram (Lexapro) 10 mg tablet   No No   Sig: Take 1 tablet (10 mg total) by mouth daily   triamcinolone (KENALOG) 0.1 % ointment   No No   Sig: Apply topically 2 (two) times a day To the affected areas for up to 14 days. Do not apply to the face, armpits, or groin.      Facility-Administered Medications: None       Past Medical History:   Diagnosis Date    Anxiety     Diet controlled gestational diabetes mellitus (GDM) in third trimester 06/16/2022    History of intrauterine growth restriction in prior pregnancy, currently  pregnant, first trimester 01/19/2022    Weighed 5 pounds at 35 weeks and 6 days    Hypertension 07/04/2018    PCOS (polycystic ovarian syndrome)     Preeclampsia, severe, third trimester 07/04/2018    with this pregnancy    Preeclampsia, third trimester 06/16/2022    Varicella     had vaccine       Past Surgical History:   Procedure Laterality Date    MOLE REMOVAL Left     arm    WISDOM TOOTH EXTRACTION Bilateral 09/07/2021       Family History   Problem Relation Age of Onset    Cancer Mother         ovarian    Asthma Mother     Ovarian cancer Mother     Diabetes Father     Sleep apnea Father     Seizures Sister         2022    ADD / ADHD Brother     Anxiety disorder Brother     Self-Injury Brother     Suicide Attempts Brother     Seizures Son     Seizures Son         9/8/22    Diabetes Maternal Grandmother     Breast cancer Maternal Grandmother         2023    Alcohol abuse Paternal Grandfather     Substance Abuse Maternal Uncle     Substance Abuse Maternal Uncle     Alcohol abuse Paternal Uncle      I have reviewed and agree with the history as documented.    E-Cigarette/Vaping    E-Cigarette Use Never User      E-Cigarette/Vaping Substances    Nicotine No     THC No     CBD No     Flavoring No     Other No     Unknown No      Social History     Tobacco Use    Smoking status: Never    Smokeless tobacco: Never   Vaping Use    Vaping status: Never Used   Substance Use Topics    Alcohol use: Not Currently    Drug use: No        Review of Systems   Constitutional:  Negative for fever.   Respiratory:  Negative for cough and shortness of breath.    Cardiovascular:  Negative for chest pain and palpitations.   Gastrointestinal:  Negative for abdominal pain, nausea and vomiting.   Genitourinary:  Negative for dysuria and hematuria.   Skin:  Negative for rash.   Neurological:  Negative for syncope.   Psychiatric/Behavioral:  Positive for depression. Negative for hallucinations.         Appears depressed and withdrawn   All  other systems reviewed and are negative.      Physical Exam  ED Triage Vitals [08/01/24 1315]   Temperature Pulse Respirations Blood Pressure SpO2   97.8 °F (36.6 °C) 76 16 117/71 100 %      Temp Source Heart Rate Source Patient Position - Orthostatic VS BP Location FiO2 (%)   Temporal Monitor Sitting Right arm --      Pain Score       --             Orthostatic Vital Signs  Vitals:    08/01/24 1315   BP: 117/71   Pulse: 76   Patient Position - Orthostatic VS: Sitting       Physical Exam  Vitals and nursing note reviewed.   Constitutional:       General: She is not in acute distress.     Appearance: She is well-developed.   HENT:      Head: Normocephalic and atraumatic.   Eyes:      Conjunctiva/sclera: Conjunctivae normal.   Cardiovascular:      Rate and Rhythm: Normal rate and regular rhythm.      Heart sounds: No murmur heard.  Pulmonary:      Effort: Pulmonary effort is normal. No respiratory distress.      Breath sounds: Normal breath sounds.   Abdominal:      Palpations: Abdomen is soft.      Tenderness: There is no abdominal tenderness.   Musculoskeletal:         General: No swelling.      Cervical back: Neck supple.   Skin:     General: Skin is warm and dry.      Capillary Refill: Capillary refill takes less than 2 seconds.   Neurological:      Mental Status: She is alert.   Psychiatric:         Thought Content: Thought content normal.         Judgment: Judgment normal.         ED Medications  Medications - No data to display    Diagnostic Studies  Results Reviewed       None                   No orders to display         Procedures  Procedures      ED Course                                       Medical Decision Making  Patient has appointment scheduled on 9 August.  I put in a referral to try to speed her appointment up.  I also talked to her about other medications she can talk to her primary care or psychiatry about to start in the future.  Crisis evaluated the patient and provided outpatient resources.   Good return precautions noted like suicidal behavior, intoxication/infection.  Patient is stable for discharge.  Psychiatric follow-up was provided.  She states that she will call and I also put in referral.  Patient does not seem to be at risk for a her safety good return precautions noted.  Patient stable for discharge and agreeable with plan.          Disposition  Final diagnoses:   Depression     Time reflects when diagnosis was documented in both MDM as applicable and the Disposition within this note       Time User Action Codes Description Comment    8/1/2024  4:39 PM Thuan Vieira Add [F32.A] Depression           ED Disposition       ED Disposition   Discharge    Condition   Stable    Date/Time   Thu Aug 1, 2024  4:39 PM    Comment   Joselito Marlow discharge to home/self care.                   MD Documentation      Flowsheet Row Most Recent Value   Sending MD Dr. Rao          Follow-up Information       Follow up With Specialties Details Why Contact Info Additional Information    Health system Internal Medicine Behavioral Health Call in 1 day  [address] [place] [id number] [phone] Health system Internal Medicine, [address] [place] [address]  [id number]  [phone]            Patient's Medications   Discharge Prescriptions    No medications on file         PDMP Review       None             ED Provider  Attending physically available and evaluated Joselito Marlow. I managed the patient along with the ED Attending.    Electronically Signed by           Thuan Vieira DO  08/01/24 8333

## 2024-08-01 NOTE — ED NOTES
Pt is a 24 y.o. female who was brought to the ED per recommendation of PCP. Patient reports that her PCP started her on Zoloft a little over a month ago and increased that dose at some point but she wasn't seeing any improvement. About nine days ago her PCP switched her to Lexapro and since that time patient's depression has been significantly worse. Patient states that she is crying all the time, feels more anxious, is irritable and snapping more, and has been having nightmares and not able to sleep. Patient called her PCP today for guidance as she is feeling much worse, but was ultimately instructed to come to the ED. Patient expressed frustration that she doesn't feel supported and listened to. Patient states that she has experienced depression and anxiety throughout her life but it has been much worse. Patient states that she is stressed due to her 1 y/o son having his second seziure in [facility] and attending frequent appointments for that. She has been to [facility] but has just been told that they don't know what's causing the seizures or if they will be life-long. 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 Patient denies suicidal/homicidal ideations and auditory/visual hallucinations.     Patient denies previous inpatient mental health treatment. She did see someone for anger management when she was younger. Recently patient completed a 12-week program through her [facility] for therapy, but states that it was all via text and just provided various tasks to do, but she didn't actually talk to anyone. She was scheduled to start therapy on June 9th, but when she contacted the therapist prior to the appointment she received an e-mail that they were on vacation for the week and never had follow-up. Patient is scheduled to start with [facility] psychiatry on September 9th, but she isn't sure if that's for psychiatry or therapy. Patient really wants to work on herself and attend  therapy, but has been faced with struggles on getting services started. Patient feels safe at home and identifies her children as her primary protective factor. Patient is able to identify positive coping skills and support system that she can utilize when she needs a break. Patient is not interested in inpatient treatment, but is receptive to contact the partial program if she feels things are getting worse prior to her scheduled appointment. Patient was provided with a list of outpatient mental health resources to follow-up with as needed.     Chief Complaint   Patient presents with    Depression     Patient presents for depression, recent med changes from certaline to lexapro on 7/23, pcp told her to come to ED, denies SI/HI     Intake Assessment completed, Safety risk Assessment completed    Mady Pratt LCSW  08/01/24    [id number]
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
## 2024-08-02 ENCOUNTER — TELEPHONE (OUTPATIENT)
Age: 24
End: 2024-08-02

## 2024-08-02 NOTE — TELEPHONE ENCOUNTER
Contacted patient regarding referral for Med management. Pt is scheduled to see Evon Escalante for talk therapy and Pt states she will speak with her and if medication management is needed, she will call to schedule.

## 2024-08-09 ENCOUNTER — TELEPHONE (OUTPATIENT)
Dept: FAMILY MEDICINE CLINIC | Facility: CLINIC | Age: 24
End: 2024-08-09

## 2024-08-09 NOTE — TELEPHONE ENCOUNTER
LMOM for pt letting her know that I switched her appt on 8/16 @ 3:00 from [place] to Dr Jackson since she has been seeing Dr Jackson for this and she is her PCP. I told the pt her appt time and date did not change just the provider.

## 2024-08-16 ENCOUNTER — OFFICE VISIT (OUTPATIENT)
Dept: FAMILY MEDICINE CLINIC | Facility: CLINIC | Age: 24
End: 2024-08-16
Payer: COMMERCIAL

## 2024-08-16 VITALS
RESPIRATION RATE: 15 BRPM | WEIGHT: 138.5 LBS | SYSTOLIC BLOOD PRESSURE: 134 MMHG | HEIGHT: 62 IN | BODY MASS INDEX: 25.49 KG/M2 | HEART RATE: 96 BPM | OXYGEN SATURATION: 100 % | TEMPERATURE: 98 F | DIASTOLIC BLOOD PRESSURE: 90 MMHG

## 2024-08-16 DIAGNOSIS — F33.2 SEVERE EPISODE OF RECURRENT MAJOR DEPRESSIVE DISORDER, WITHOUT PSYCHOTIC FEATURES (HCC): Primary | ICD-10-CM

## 2024-08-16 PROCEDURE — 99213 OFFICE O/P EST LOW 20 MIN: CPT | Performed by: NURSE PRACTITIONER

## 2024-08-16 NOTE — LETTER
August 16, 2024     Patient: Joselito Marlow  YOB: 2000  Date of Visit: 8/16/2024      To Whom it May Concern:    Joselito Marlow is under my professional care. Joselito was seen in my office on 8/16/2024. I recommend that Corinne remain out of work 08/19/2024-10/14/2024.    If you have any questions or concerns, please don't hesitate to call.         Sincerely,          SOFI Diaz        CC: No Recipients

## 2024-08-19 ENCOUNTER — APPOINTMENT (OUTPATIENT)
Dept: LAB | Facility: CLINIC | Age: 24
End: 2024-08-19

## 2024-08-19 DIAGNOSIS — Z77.21 EXPOSURE TO BLOODBORNE PATHOGEN: ICD-10-CM

## 2024-08-19 PROBLEM — F33.2 SEVERE EPISODE OF RECURRENT MAJOR DEPRESSIVE DISORDER, WITHOUT PSYCHOTIC FEATURES (HCC): Status: ACTIVE | Noted: 2024-08-19

## 2024-08-19 LAB
HCV AB SER QL: NORMAL
HIV 1+2 AB+HIV1 P24 AG SERPL QL IA: NORMAL
HIV 2 AB SERPL QL IA: NORMAL
HIV1 AB SERPL QL IA: NORMAL
HIV1 P24 AG SERPL QL IA: NORMAL

## 2024-08-19 PROCEDURE — 36415 COLL VENOUS BLD VENIPUNCTURE: CPT

## 2024-08-19 PROCEDURE — 86803 HEPATITIS C AB TEST: CPT

## 2024-08-19 PROCEDURE — 87389 HIV-1 AG W/HIV-1&-2 AB AG IA: CPT

## 2024-08-19 NOTE — PROGRESS NOTES
"Ambulatory Visit  Name: Joselito Marlow      : 2000      MRN: [id number]  Encounter Provider: SOFI Diaz  Encounter Date: 2024   Encounter department: [facility]    Assessment & Plan   1. Severe episode of recurrent major depressive disorder, without psychotic features (HCC)    FMLA paperwork completed today. Pt is hesitant to start any medications at this time. She does have appointment w/ Psych on 24. Patient is encouraged to call our office for any questions/concerns, persistent or worsening symptoms. Patient states they understand and agree with treatment plan.         Pt to f/u in mid September for recheck or sooner PRN.     History of Present Illness     Pt presents today for ER follow up after going to ER on  for severe depression.  She does not have any active SI/HI thoughts nor plan.  She was on Sertraline earlier this summer, but did not notice any benefit from 25 mg dosing.  Pt was then switched to Lexapro, but felt like it was making her feel worse.  Pt denies any worsening of her depression w/ Sertraline.  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   It's hard for her to deal with the fact that she does not feel okay.  Pt is hesitant about a partial hospital program because she has young kids she is taking care of and is not sure if she can have family/friends watch the kids.        Review of Systems  As noted per HPI.    Objective     /90   Pulse 96   Temp 98 °F (36.7 °C)   Resp 15   Ht 5' 1.5\" (1.562 m)   Wt 62.8 kg (138 lb 8 oz)   SpO2 100%   BMI 25.75 kg/m²     Physical Exam  Vitals reviewed.   Constitutional:       Appearance: Normal appearance.   Pulmonary:      Effort: Pulmonary effort is normal.   Neurological:      Mental Status: She is alert and oriented to person, place, and time. Mental status is at baseline.   Psychiatric: "         Mood and Affect: Mood is depressed.         Behavior: Behavior normal.         Thought Content: Thought content normal. Thought content does not include homicidal or suicidal ideation. Thought content does not include homicidal or suicidal plan.         Judgment: Judgment normal.

## 2024-09-09 ENCOUNTER — OFFICE VISIT (OUTPATIENT)
Dept: BEHAVIORAL/MENTAL HEALTH CLINIC | Facility: CLINIC | Age: 24
End: 2024-09-09
Payer: COMMERCIAL

## 2024-09-09 DIAGNOSIS — F33.2 SEVERE EPISODE OF RECURRENT MAJOR DEPRESSIVE DISORDER, WITHOUT PSYCHOTIC FEATURES (HCC): Primary | ICD-10-CM

## 2024-09-09 PROCEDURE — 90791 PSYCH DIAGNOSTIC EVALUATION: CPT
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
## 2024-09-11 ENCOUNTER — TELEPHONE (OUTPATIENT)
Age: 24
End: 2024-09-11

## 2024-09-11 NOTE — TELEPHONE ENCOUNTER
Lorna, [employer]. Following up on medical records request. advised fax not received. And provided medical records fax number and email

## 2024-09-18 ENCOUNTER — OFFICE VISIT (OUTPATIENT)
Dept: FAMILY MEDICINE CLINIC | Facility: CLINIC | Age: 24
End: 2024-09-18
Payer: COMMERCIAL

## 2024-09-18 VITALS
SYSTOLIC BLOOD PRESSURE: 110 MMHG | TEMPERATURE: 97.7 F | OXYGEN SATURATION: 99 % | RESPIRATION RATE: 15 BRPM | HEIGHT: 62 IN | BODY MASS INDEX: 25.58 KG/M2 | WEIGHT: 139 LBS | DIASTOLIC BLOOD PRESSURE: 80 MMHG | HEART RATE: 88 BPM

## 2024-09-18 DIAGNOSIS — F32.A ANXIETY AND DEPRESSION: ICD-10-CM

## 2024-09-18 DIAGNOSIS — F41.9 ANXIETY AND DEPRESSION: ICD-10-CM

## 2024-09-18 DIAGNOSIS — F32.A DEPRESSION, UNSPECIFIED DEPRESSION TYPE: Primary | ICD-10-CM

## 2024-09-18 PROCEDURE — 99213 OFFICE O/P EST LOW 20 MIN: CPT | Performed by: NURSE PRACTITIONER

## 2024-09-18 RX ORDER — HYDROXYZINE HYDROCHLORIDE 25 MG/1
25 TABLET, FILM COATED ORAL EVERY 6 HOURS PRN
Qty: 30 TABLET | Refills: 0 | Status: SHIPPED | OUTPATIENT
Start: 2024-09-18

## 2024-09-18 RX ORDER — BUPROPION HYDROCHLORIDE 150 MG/1
150 TABLET ORAL EVERY MORNING
Qty: 30 TABLET | Refills: 5 | Status: SHIPPED | OUTPATIENT
Start: 2024-09-18 | End: 2025-03-17

## 2024-09-27 ENCOUNTER — APPOINTMENT (OUTPATIENT)
Dept: LAB | Facility: CLINIC | Age: 24
End: 2024-09-27
Payer: COMMERCIAL

## 2024-09-27 ENCOUNTER — OFFICE VISIT (OUTPATIENT)
Dept: OBGYN CLINIC | Facility: CLINIC | Age: 24
End: 2024-09-27
Payer: COMMERCIAL

## 2024-09-27 VITALS
HEIGHT: 62 IN | SYSTOLIC BLOOD PRESSURE: 102 MMHG | DIASTOLIC BLOOD PRESSURE: 68 MMHG | WEIGHT: 135 LBS | BODY MASS INDEX: 24.84 KG/M2

## 2024-09-27 DIAGNOSIS — N89.8 VAGINAL IRRITATION: ICD-10-CM

## 2024-09-27 DIAGNOSIS — R30.0 DYSURIA: Primary | ICD-10-CM

## 2024-09-27 DIAGNOSIS — Z20.2 POSSIBLE EXPOSURE TO STD: ICD-10-CM

## 2024-09-27 DIAGNOSIS — R30.0 DYSURIA: ICD-10-CM

## 2024-09-27 LAB
BACTERIA UR QL AUTO: ABNORMAL /HPF
BILIRUB UR QL STRIP: NEGATIVE
CLARITY UR: CLEAR
COLOR UR: YELLOW
GLUCOSE UR STRIP-MCNC: NEGATIVE MG/DL
HGB UR QL STRIP.AUTO: NEGATIVE
KETONES UR STRIP-MCNC: NEGATIVE MG/DL
LEUKOCYTE ESTERASE UR QL STRIP: NEGATIVE
NITRITE UR QL STRIP: NEGATIVE
NON-SQ EPI CELLS URNS QL MICRO: ABNORMAL /HPF
PH UR STRIP.AUTO: 7.5 [PH]
PROT UR STRIP-MCNC: ABNORMAL MG/DL
RBC #/AREA URNS AUTO: ABNORMAL /HPF
SP GR UR STRIP.AUTO: 1.02 (ref 1–1.03)
UROBILINOGEN UR STRIP-ACNC: 3 MG/DL
WBC #/AREA URNS AUTO: ABNORMAL /HPF

## 2024-09-27 PROCEDURE — 87086 URINE CULTURE/COLONY COUNT: CPT

## 2024-09-27 PROCEDURE — 87591 N.GONORRHOEAE DNA AMP PROB: CPT | Performed by: STUDENT IN AN ORGANIZED HEALTH CARE EDUCATION/TRAINING PROGRAM

## 2024-09-27 PROCEDURE — 87563 M. GENITALIUM AMP PROBE: CPT | Performed by: STUDENT IN AN ORGANIZED HEALTH CARE EDUCATION/TRAINING PROGRAM

## 2024-09-27 PROCEDURE — 99213 OFFICE O/P EST LOW 20 MIN: CPT | Performed by: STUDENT IN AN ORGANIZED HEALTH CARE EDUCATION/TRAINING PROGRAM

## 2024-09-27 PROCEDURE — 81514 NFCT DS BV&VAGINITIS DNA ALG: CPT | Performed by: STUDENT IN AN ORGANIZED HEALTH CARE EDUCATION/TRAINING PROGRAM

## 2024-09-27 PROCEDURE — 87491 CHLMYD TRACH DNA AMP PROBE: CPT | Performed by: STUDENT IN AN ORGANIZED HEALTH CARE EDUCATION/TRAINING PROGRAM

## 2024-09-27 PROCEDURE — 81001 URINALYSIS AUTO W/SCOPE: CPT

## 2024-09-27 NOTE — PROGRESS NOTES
"Ambulatory Visit  Name: Joselito Marlow      : 2000      MRN: [id number]  Encounter Provider: Flores Garvin MD  Encounter Date: 2024   Encounter department: [facility] [place]    Assessment & Plan  Dysuria    Orders:    Urine culture; Future    Urinalysis with microscopic; Future    Molecular Vaginal Panel    Chlamydia/GC amplified DNA by PCR    Trichomonas vaginalis/Mycoplasma genitalium PCR    Vaginal irritation  - vaginal cultures collected   - worse with intercourse- using coconut oil which helps   Orders:    Molecular Vaginal Panel    Chlamydia/GC amplified DNA by PCR    Trichomonas vaginalis/Mycoplasma genitalium PCR    Possible exposure to STD  - discussed STI prevention with condom use  - declines birth control   Orders:    Molecular Vaginal Panel    Chlamydia/GC amplified DNA by PCR    Trichomonas vaginalis/Mycoplasma genitalium PCR      History of Present Illness     Joselito Marlow is a 24 y.o. female who presents fir potential STD exposure- had unprotected sex with her partner of 7 years who then confirmed he had another sexual partner earlier that day.     Does not use condoms, not on birth control. Does not plan on pregnancy in the next year but also not interested in birth control at this time.     Having vaginal irritation, especially with intercourse. Feels like she is dry since having her last child. Using coconut oil, which does help. Does not engage in foreplay- just goes straight into intercourse.     History obtained from : patient  Review of Systems   All other systems reviewed and are negative.          Objective     /68 (BP Location: Left arm, Patient Position: Sitting, Cuff Size: Standard)   Ht 5' 1.5\" (1.562 m)   Wt 61.2 kg (135 lb)   LMP 2024 (Exact Date) Comment: PCOS - periods are irregular  BMI 25.09 kg/m²     Physical Exam  Vitals and nursing note reviewed.   Constitutional:       General: She is not in acute " distress.     Appearance: She is well-developed.   HENT:      Head: Normocephalic and atraumatic.   Eyes:      Conjunctiva/sclera: Conjunctivae normal.   Cardiovascular:      Rate and Rhythm: Normal rate and regular rhythm.      Heart sounds: No murmur heard.  Pulmonary:      Effort: Pulmonary effort is normal. No respiratory distress.      Breath sounds: Normal breath sounds.   Abdominal:      Palpations: Abdomen is soft.      Tenderness: There is no abdominal tenderness.   Genitourinary:     Exam position: Lithotomy position.      Vagina: Normal.      Cervix: Discharge and erythema present.      Comments: External vulvar erythema   Musculoskeletal:         General: No swelling.      Cervical back: Neck supple.   Skin:     General: Skin is warm and dry.      Capillary Refill: Capillary refill takes less than 2 seconds.   Neurological:      Mental Status: She is alert.   Psychiatric:         Mood and Affect: Mood normal.

## 2024-09-28 LAB
BACTERIA UR CULT: NORMAL
C TRACH DNA SPEC QL NAA+PROBE: NEGATIVE
N GONORRHOEA DNA SPEC QL NAA+PROBE: NEGATIVE

## 2024-09-29 LAB
C GLABRATA DNA VAG QL NAA+PROBE: NEGATIVE
C KRUSEI DNA VAG QL NAA+PROBE: NEGATIVE
CANDIDA SP 6 PNL VAG NAA+PROBE: NEGATIVE
T VAGINALIS DNA VAG QL NAA+PROBE: NEGATIVE
VAGINOSIS/ITIS DNA PNL VAG PROBE+SIG AMP: NEGATIVE

## 2024-09-30 ENCOUNTER — TELEPHONE (OUTPATIENT)
Dept: PSYCHIATRY | Facility: CLINIC | Age: 24
End: 2024-09-30

## 2024-09-30 DIAGNOSIS — Z11.3 SCREENING EXAMINATION FOR STI: Primary | ICD-10-CM

## 2024-09-30 DIAGNOSIS — Z20.2 POSSIBLE EXPOSURE TO STI: ICD-10-CM

## 2024-09-30 LAB
M GENITALIUM DNA SPEC QL NAA+PROBE: NEGATIVE
T VAGINALIS DNA SPEC QL NAA+PROBE: NEGATIVE

## 2024-09-30 NOTE — TELEPHONE ENCOUNTER
A medical records request was received 9/30/24 from [facility]. The date range  is 9/1/24 to present.    Paperwork was placed in Evon's mailbox for review.

## 2024-10-01 ENCOUNTER — APPOINTMENT (OUTPATIENT)
Dept: LAB | Facility: CLINIC | Age: 24
End: 2024-10-01
Payer: COMMERCIAL

## 2024-10-01 DIAGNOSIS — Z11.3 SCREENING EXAMINATION FOR STI: ICD-10-CM

## 2024-10-01 DIAGNOSIS — Z20.2 POSSIBLE EXPOSURE TO STI: ICD-10-CM

## 2024-10-01 PROCEDURE — 36415 COLL VENOUS BLD VENIPUNCTURE: CPT

## 2024-10-01 PROCEDURE — 87536 HIV-1 QUANT&REVRSE TRNSCRPJ: CPT

## 2024-10-01 PROCEDURE — 86780 TREPONEMA PALLIDUM: CPT

## 2024-10-02 LAB
HIV1 RNA # PLAS NAA DL=20: NOT DETECTED {COPIES}/ML
SL AMB TREPONEMA PALLIDUM ANTIBODIES: NON REACTIVE

## 2024-10-03 NOTE — TELEPHONE ENCOUNTER
[facility] called to confirm medical records request received by office. Writer confirmed request was received and forwarded to provider's review per prior note.

## 2024-10-07 ENCOUNTER — OFFICE VISIT (OUTPATIENT)
Dept: FAMILY MEDICINE CLINIC | Facility: CLINIC | Age: 24
End: 2024-10-07
Payer: COMMERCIAL

## 2024-10-07 VITALS
OXYGEN SATURATION: 100 % | DIASTOLIC BLOOD PRESSURE: 86 MMHG | RESPIRATION RATE: 14 BRPM | HEART RATE: 76 BPM | BODY MASS INDEX: 25.06 KG/M2 | TEMPERATURE: 97.8 F | SYSTOLIC BLOOD PRESSURE: 108 MMHG | WEIGHT: 136.2 LBS | HEIGHT: 62 IN

## 2024-10-07 DIAGNOSIS — Z20.2 POSSIBLE EXPOSURE TO STI: ICD-10-CM

## 2024-10-07 DIAGNOSIS — F32.A ANXIETY AND DEPRESSION: Primary | ICD-10-CM

## 2024-10-07 DIAGNOSIS — F41.9 ANXIETY AND DEPRESSION: Primary | ICD-10-CM

## 2024-10-07 PROCEDURE — 99213 OFFICE O/P EST LOW 20 MIN: CPT | Performed by: NURSE PRACTITIONER

## 2024-10-07 RX ORDER — BUPROPION HYDROCHLORIDE 150 MG/1
150 TABLET ORAL EVERY MORNING
Qty: 90 TABLET | Refills: 3 | Status: SHIPPED | OUTPATIENT
Start: 2024-10-07

## 2024-10-07 RX ORDER — HYDROXYZINE HYDROCHLORIDE 25 MG/1
25 TABLET, FILM COATED ORAL EVERY 6 HOURS PRN
Qty: 30 TABLET | Refills: 6 | Status: SHIPPED | OUTPATIENT
Start: 2024-10-07

## 2024-10-07 NOTE — LETTER
October 7, 2024     Patient: Joselito Marlow  YOB: 2000  Date of Visit: 10/7/2024      To Whom it May Concern:    Joselito Marlow is under my professional care. Joselito was seen in my office on 10/7/2024. Joselito may return to work on 10/16/2024 .    If you have any questions or concerns, please don't hesitate to call.         Sincerely,          SOFI Diaz        CC: No Recipients

## 2024-10-07 NOTE — PROGRESS NOTES
"Ambulatory Visit  Name: Joselito Marlow      : 2000      MRN: [id number]  Encounter Provider: SOFI Diaz  Encounter Date: 10/7/2024   Encounter department: [facility]    Assessment & Plan  Anxiety and depression      Orders:    buPROPion (WELLBUTRIN XL) 150 mg 24 hr tablet; Take 1 tablet (150 mg total) by mouth every morning    hydrOXYzine HCL (ATARAX) 25 mg tablet; Take 1 tablet (25 mg total) by mouth every 6 (six) hours as needed for anxiety    Refills prescribed. Pt encouraged to reach out if she ever feels she needs her dosage adjusted. Plan to have her recheck in the office for her physical in 2025. Patient is encouraged to call our office for any questions/concerns, persistent or worsening symptoms. Patient states they understand and agree with treatment plan.   Possible exposure to STI    Orders:    Chlamydia/GC amplified DNA by PCR; Future    HIV-1 RNA, Quantitative PCR, [facility]; Future    HSV TYPE 1,2 DNA PCR; Future    RPR-Syphilis Screening (Total Syphilis IGG/IGM); Future        Pt to f/u PRN.     History of Present Illness     Pt here today for f/u of her anxiety and depression.  She is noting some positive improvement w/ Wellbutrin.  She is content w/ current dosing.  Pt does use Atarax PRN.  She is ready to return to work after her FMLA leave next week.        History obtained from : patient  Review of Systems  As noted per HPI.  Medical History Reviewed by provider this encounter:           Objective     /86   Pulse 76   Temp 97.8 °F (36.6 °C)   Resp 14   Ht 5' 1.5\" (1.562 m)   Wt 61.8 kg (136 lb 3.2 oz)   LMP 2024 (Exact Date) Comment: PCOS - periods are irregular  SpO2 100%   BMI 25.32 kg/m²     Physical Exam  Vitals reviewed.   Constitutional:       Appearance: Normal appearance.   Pulmonary:      Effort: Pulmonary effort is normal.   Neurological:      Mental Status: She is alert and oriented to " person, place, and time. Mental status is at baseline.   Psychiatric:         Mood and Affect: Mood normal.         Behavior: Behavior normal.         Thought Content: Thought content normal.         Judgment: Judgment normal.

## 2024-10-10 ENCOUNTER — TELEPHONE (OUTPATIENT)
Age: 24
End: 2024-10-10

## 2024-10-10 NOTE — TELEPHONE ENCOUNTER
Left a message for [facility] that medical records request had been received.

## 2024-10-10 NOTE — TELEPHONE ENCOUNTER
[employer] [phone] called to verify if we received the request for Medical Records for Pablo. They are asking for return call to verify.

## 2024-11-05 ENCOUNTER — APPOINTMENT (OUTPATIENT)
Dept: LAB | Facility: CLINIC | Age: 24
End: 2024-11-05

## 2024-11-05 DIAGNOSIS — Z77.21 EXPOSURE TO BLOODBORNE PATHOGEN: ICD-10-CM

## 2024-11-05 DIAGNOSIS — Z20.2 POSSIBLE EXPOSURE TO STI: ICD-10-CM

## 2024-11-05 PROCEDURE — 36415 COLL VENOUS BLD VENIPUNCTURE: CPT

## 2024-11-05 PROCEDURE — 86780 TREPONEMA PALLIDUM: CPT

## 2024-11-05 PROCEDURE — 87529 HSV DNA AMP PROBE: CPT

## 2024-11-05 PROCEDURE — 87591 N.GONORRHOEAE DNA AMP PROB: CPT

## 2024-11-05 PROCEDURE — 87389 HIV-1 AG W/HIV-1&-2 AB AG IA: CPT

## 2024-11-05 PROCEDURE — 87536 HIV-1 QUANT&REVRSE TRNSCRPJ: CPT

## 2024-11-05 PROCEDURE — 86803 HEPATITIS C AB TEST: CPT

## 2024-11-05 PROCEDURE — 87491 CHLMYD TRACH DNA AMP PROBE: CPT

## 2024-11-06 LAB
C TRACH DNA SPEC QL NAA+PROBE: NEGATIVE
HCV AB SER QL: NORMAL
HIV 1+2 AB+HIV1 P24 AG SERPL QL IA: NORMAL
HIV 2 AB SERPL QL IA: NORMAL
HIV1 AB SERPL QL IA: NORMAL
HIV1 P24 AG SERPL QL IA: NORMAL
HIV1 RNA # PLAS NAA DL=20: NOT DETECTED {COPIES}/ML
N GONORRHOEA DNA SPEC QL NAA+PROBE: NEGATIVE
TREPONEMA PALLIDUM IGG+IGM AB [PRESENCE] IN SERUM OR PLASMA BY IMMUNOASSAY: NORMAL

## 2024-11-08 LAB
HSV1 DNA SPEC QL NAA+PROBE: NEGATIVE
HSV2 DNA SPEC QL NAA+PROBE: NEGATIVE

## 2024-11-25 ENCOUNTER — TELEPHONE (OUTPATIENT)
Dept: BEHAVIORAL/MENTAL HEALTH CLINIC | Facility: CLINIC | Age: 24
End: 2024-11-25

## 2024-11-25 NOTE — TELEPHONE ENCOUNTER
Call to Pablo Martinez to offer an appointment opening on 12/5 at 3pm. She stated that she is not available due to many appointments for her children during the month of December. She confirmed availability at 3pm and at our next scheduled appointment on 1/7/25 at 3pm. Provider explained the avenue for reaching out in the interim if needed. She thanked provider for the call.

## 2024-11-27 ENCOUNTER — NURSE TRIAGE (OUTPATIENT)
Age: 24
End: 2024-11-27

## 2024-11-27 NOTE — TELEPHONE ENCOUNTER
"Incoming call from patient. States that she has PCOS. Started period yesterday, 11/26, and bleeding is heavier than normal. Since waking up this morning patient has changed her pat 3x in 4 hours. Denies clots or abdominal pain. Denies feeling lightheaded but states that she does feel more fatigued than normal. Patient inquiring if there are labs that should be ordered or if office visit would be recommended.     Bleeding precautions reviewed and patient aware when to seek care in ED if bleeding increases or other symptoms occur.     Routing to provider for review.     Answer Assessment - Initial Assessment Questions  1. BLEEDING SEVERITY: \"Describe the bleeding that you are having.\" \"How much bleeding is there?\"       More bleeding than normal menses. 3 pads in 4 hours since waking up this morning.  2. ONSET: \"When did the bleeding begin?\" \"Is it continuing now?\"      11/26  3. MENSTRUAL PERIOD: \"When was the last normal menstrual period?\" \"How is this different than your period?\"      Heavier than normal  4. REGULARITY: \"How regular are your periods?\"      regular  5. ABDOMEN PAIN: \"Do you have any pain?\" \"How bad is the pain?\"  (e.g., Scale 0-10; none, mild, moderate, or severe)      denies  6. PREGNANCY: \"Is there any chance you are pregnant?\" \"When was your last menstrual period?\"      denies  7. BREASTFEEDING: \"Are you breastfeeding?\"      denies  8. HORMONE MEDICINES: \"Are you taking any hormone medicines, prescription or over-the-counter?\" (e.g., birth control pills, estrogen)      denies  9. BLOOD THINNER MEDICINES: \"Do you take any blood thinners?\" (e.g., Coumadin / warfarin, Pradaxa / dabigatran, aspirin)      denies  10. CAUSE: \"What do you think is causing the bleeding?\" (e.g., recent gyn surgery, recent gyn procedure; known bleeding disorder, cervical cancer, polycystic ovarian disease, fibroids)          denies  11. HEMODYNAMIC STATUS: \"Are you weak or feeling lightheaded?\" If Yes, ask: \"Can you stand " "and walk normally?\"         denies  12. OTHER SYMPTOMS: \"What other symptoms are you having with the bleeding?\" (e.g., passed tissue, vaginal discharge, fever, menstrual-type cramps)        Fatigue, nausea.    Protocols used: Vaginal Bleeding - Abnormal-Adult-[place]    "

## 2024-12-03 ENCOUNTER — TELEPHONE (OUTPATIENT)
Age: 24
End: 2024-12-03

## 2024-12-03 NOTE — TELEPHONE ENCOUNTER
Patient called to ask PCP to order HIV testing for her. She received news her partner is positive for HIV. She is asking if PCP has any further recommendations and would be willing to call her. She is upset. Thank you

## 2024-12-04 ENCOUNTER — APPOINTMENT (OUTPATIENT)
Dept: LAB | Facility: CLINIC | Age: 24
End: 2024-12-04
Payer: COMMERCIAL

## 2024-12-04 DIAGNOSIS — Z20.2 POSSIBLE EXPOSURE TO STI: Primary | ICD-10-CM

## 2024-12-04 DIAGNOSIS — Z20.2 POSSIBLE EXPOSURE TO STI: ICD-10-CM

## 2024-12-04 PROCEDURE — 87536 HIV-1 QUANT&REVRSE TRNSCRPJ: CPT

## 2024-12-04 PROCEDURE — 87529 HSV DNA AMP PROBE: CPT

## 2024-12-04 PROCEDURE — 36415 COLL VENOUS BLD VENIPUNCTURE: CPT

## 2024-12-06 LAB — HIV1 RNA # PLAS NAA DL=20: NOT DETECTED {COPIES}/ML

## 2024-12-07 LAB
HSV1 DNA SPEC QL NAA+PROBE: NEGATIVE
HSV2 DNA SPEC QL NAA+PROBE: NEGATIVE

## 2024-12-09 ENCOUNTER — RESULTS FOLLOW-UP (OUTPATIENT)
Dept: FAMILY MEDICINE CLINIC | Facility: CLINIC | Age: 24
End: 2024-12-09

## 2024-12-13 ENCOUNTER — OFFICE VISIT (OUTPATIENT)
Dept: FAMILY MEDICINE CLINIC | Facility: CLINIC | Age: 24
End: 2024-12-13
Payer: COMMERCIAL

## 2024-12-13 VITALS
RESPIRATION RATE: 14 BRPM | SYSTOLIC BLOOD PRESSURE: 106 MMHG | HEIGHT: 62 IN | DIASTOLIC BLOOD PRESSURE: 80 MMHG | BODY MASS INDEX: 24.11 KG/M2 | TEMPERATURE: 98 F | WEIGHT: 131 LBS | HEART RATE: 77 BPM | OXYGEN SATURATION: 99 %

## 2024-12-13 DIAGNOSIS — F32.A ANXIETY AND DEPRESSION: Primary | ICD-10-CM

## 2024-12-13 DIAGNOSIS — F41.9 ANXIETY AND DEPRESSION: Primary | ICD-10-CM

## 2024-12-13 DIAGNOSIS — R11.0 NAUSEA: ICD-10-CM

## 2024-12-13 DIAGNOSIS — N89.8 VAGINAL IRRITATION: ICD-10-CM

## 2024-12-13 PROCEDURE — 87660 TRICHOMONAS VAGIN DIR PROBE: CPT | Performed by: NURSE PRACTITIONER

## 2024-12-13 PROCEDURE — 87480 CANDIDA DNA DIR PROBE: CPT | Performed by: NURSE PRACTITIONER

## 2024-12-13 PROCEDURE — 99214 OFFICE O/P EST MOD 30 MIN: CPT | Performed by: NURSE PRACTITIONER

## 2024-12-13 PROCEDURE — 87510 GARDNER VAG DNA DIR PROBE: CPT | Performed by: NURSE PRACTITIONER

## 2024-12-13 RX ORDER — ONDANSETRON 4 MG/1
4 TABLET, FILM COATED ORAL EVERY 8 HOURS PRN
Qty: 20 TABLET | Refills: 0 | Status: SHIPPED | OUTPATIENT
Start: 2024-12-13

## 2024-12-13 RX ORDER — BUPROPION HYDROCHLORIDE 300 MG/1
300 TABLET ORAL EVERY MORNING
Qty: 90 TABLET | Refills: 3 | Status: SHIPPED | OUTPATIENT
Start: 2024-12-13

## 2024-12-13 NOTE — PROGRESS NOTES
Name: Joselito Marlow      : 2000      MRN: [id number]  Encounter Provider: SOFI Diaz  Encounter Date: 2024   Encounter department: [facility]  :  Assessment & Plan  Anxiety and depression      Orders:    buPROPion (WELLBUTRIN XL) 300 mg 24 hr tablet; Take 1 tablet (300 mg total) by mouth every morning    Wellbutrin increased from 150 to 300 mg daily. Pt will continue to monitor her weight and appetite. If she has any concerns about weight or appetite, we can always decrease back to 150 mg if needed. Patient is encouraged to call our office for any questions/concerns, persistent or worsening symptoms. Patient states they understand and agree with treatment plan.   Nausea    Orders:    ondansetron (ZOFRAN) 4 mg tablet; Take 1 tablet (4 mg total) by mouth every 8 (eight) hours as needed for nausea or vomiting    Zofran ordered PRN for nausea. Most likely d/t increased stress and anxiety.  Vaginal irritation    Orders:    Vaginosis DNA Probe  Reassured patient area to pelvic bone appears to be ingrown hair. It does not appear infected. Plan to send vaginal culture to evaluate for infection. Most likely local irritation from pantiliner. Patient is encouraged to call our office for any questions/concerns, persistent or worsening symptoms. Patient states they understand and agree with treatment plan.         Pt to RTO in 1 month for anxiety/depression recheck or sooner PRN.         History of Present Illness     Pt presents today for vaginal irritation x 3 days as well as a follow up of her depression/anxiety.   She thought the irritation may have been from a pantiliner she used on Tuesday.  She has since stopped using this and her irritaiton has improved some.  Pt denies vaginal discharge.  She notes an irritated area to her pelvic bone area, but believes it is an ingrown hair.  Pt notes her partner was unfaithful back in September and had  "another sexual partner.  He was diagnosed w/ HSV1 recently.  He is now on Valtrex suppression.  Pt was retested approx 90 days out from their sexual encounter and was negative for HSV1/2 and HIV.      Review of Systems  As noted per HPI.  Objective   /80   Pulse 77   Temp 98 °F (36.7 °C)   Resp 14   Ht 5' 1.5\" (1.562 m)   Wt 59.4 kg (131 lb)   SpO2 99%   BMI 24.35 kg/m²      Physical Exam  Vitals reviewed.   Constitutional:       Appearance: Normal appearance.   Genitourinary:     Labia:         Right: No rash or tenderness.         Left: No rash or tenderness.       Vagina: Normal. No vaginal discharge or tenderness.      Comments: Ingrown hair to left side of pelvis. No surrounding edema or erythema  Neurological:      Mental Status: She is alert and oriented to person, place, and time. Mental status is at baseline.   Psychiatric:         Mood and Affect: Mood is anxious and depressed. Affect is flat.         "

## 2024-12-14 LAB
CANDIDA RRNA VAG QL PROBE: NOT DETECTED
G VAGINALIS RRNA GENITAL QL PROBE: NOT DETECTED
T VAGINALIS RRNA GENITAL QL PROBE: NOT DETECTED

## 2024-12-16 ENCOUNTER — RESULTS FOLLOW-UP (OUTPATIENT)
Dept: FAMILY MEDICINE CLINIC | Facility: CLINIC | Age: 24
End: 2024-12-16

## 2024-12-18 ENCOUNTER — NURSE TRIAGE (OUTPATIENT)
Age: 24
End: 2024-12-18

## 2024-12-18 DIAGNOSIS — R30.0 DYSURIA: Primary | ICD-10-CM

## 2024-12-18 NOTE — TELEPHONE ENCOUNTER
Regarding: pressure, urinary frequency, slight burning  ----- Message from Barb GRULLON sent at 12/18/2024  1:16 PM EST -----  Patient called w/ possible UTI: reporting intermittent pressure, some increase in urinary frequency & slight burning w/ urination. Pt denies discharge. Pt had a vaginosis DNA culture 12/13/14 by PCP, returned normal.   Pt established with [facility]   Attempted warm transfer

## 2024-12-18 NOTE — TELEPHONE ENCOUNTER
"Patient calling in to report irritation and pressure with urination. States she was just seen by PCP who did vaginal exam and swab and was negative for infection. States partner was recently diagnosed with HSV-1, but patient states she was tested for this and was negative. Also states there are no sores present.    Urinalysis/urine culture ordered per protocol. Advised increased hydration, cranberry supplements, AZO as needed. Patient verbalized understanding.    Answer Assessment - Initial Assessment Questions  1. SYMPTOM: \"What's the main symptom you're concerned about?\" (e.g., frequency, incontinence)      Irritation and pressure with urination  2. ONSET: \"When did the  s/s  start?\"      Last week - Tuesday  3. PAIN: \"Is there any pain?\" If Yes, ask: \"How bad is it?\" (Scale: 1-10; mild, moderate, severe)      Denies  4. CAUSE: \"What do you think is causing the symptoms?\"      Unsure - denies vaginitis symptoms. Was seen by PCP for vaginal exam.  5. OTHER SYMPTOMS: \"Do you have any other symptoms?\" (e.g., blood in urine, fever, flank pain, pain with urination)      Denies  6. PREGNANCY: \"Is there any chance you are pregnant?\" \"When was your last menstrual period?\"      11/26/24 LMP    Protocols used: Urinary Symptoms-Adult-[place]    Please order Urinalysis and C&S.    "

## 2024-12-19 ENCOUNTER — APPOINTMENT (OUTPATIENT)
Dept: LAB | Facility: CLINIC | Age: 24
End: 2024-12-19
Payer: COMMERCIAL

## 2024-12-19 DIAGNOSIS — R30.0 DYSURIA: ICD-10-CM

## 2024-12-19 LAB
BACTERIA UR QL AUTO: ABNORMAL /HPF
BILIRUB UR QL STRIP: NEGATIVE
CLARITY UR: CLEAR
COLOR UR: ABNORMAL
GLUCOSE UR STRIP-MCNC: NEGATIVE MG/DL
HGB UR QL STRIP.AUTO: NEGATIVE
KETONES UR STRIP-MCNC: NEGATIVE MG/DL
LEUKOCYTE ESTERASE UR QL STRIP: NEGATIVE
MUCOUS THREADS UR QL AUTO: ABNORMAL
NITRITE UR QL STRIP: NEGATIVE
NON-SQ EPI CELLS URNS QL MICRO: ABNORMAL /HPF
PH UR STRIP.AUTO: 6.5 [PH]
PROT UR STRIP-MCNC: ABNORMAL MG/DL
RBC #/AREA URNS AUTO: ABNORMAL /HPF
SP GR UR STRIP.AUTO: 1.02 (ref 1–1.03)
UROBILINOGEN UR STRIP-ACNC: 2 MG/DL
WBC #/AREA URNS AUTO: ABNORMAL /HPF

## 2024-12-19 PROCEDURE — 81001 URINALYSIS AUTO W/SCOPE: CPT

## 2024-12-19 PROCEDURE — 87086 URINE CULTURE/COLONY COUNT: CPT

## 2024-12-20 ENCOUNTER — RESULTS FOLLOW-UP (OUTPATIENT)
Dept: OBGYN CLINIC | Facility: CLINIC | Age: 24
End: 2024-12-20

## 2024-12-20 ENCOUNTER — APPOINTMENT (OUTPATIENT)
Dept: LAB | Facility: CLINIC | Age: 24
End: 2024-12-20
Payer: COMMERCIAL

## 2024-12-20 DIAGNOSIS — Z20.2 POSSIBLE EXPOSURE TO STI: Primary | ICD-10-CM

## 2024-12-20 DIAGNOSIS — Z20.2 POSSIBLE EXPOSURE TO STI: ICD-10-CM

## 2024-12-20 LAB — BACTERIA UR CULT: NORMAL

## 2024-12-20 PROCEDURE — 87529 HSV DNA AMP PROBE: CPT

## 2024-12-23 DIAGNOSIS — N89.8 VAGINAL IRRITATION: Primary | ICD-10-CM

## 2024-12-23 RX ORDER — CLOTRIMAZOLE AND BETAMETHASONE DIPROPIONATE 10; .64 MG/G; MG/G
CREAM TOPICAL 2 TIMES DAILY
Qty: 45 G | Refills: 0 | Status: SHIPPED | OUTPATIENT
Start: 2024-12-23 | End: 2024-12-30

## 2024-12-25 LAB
HSV1 DNA SPEC QL NAA+PROBE: NEGATIVE
HSV2 DNA SPEC QL NAA+PROBE: NEGATIVE

## 2024-12-30 ENCOUNTER — RESULTS FOLLOW-UP (OUTPATIENT)
Dept: FAMILY MEDICINE CLINIC | Facility: CLINIC | Age: 24
End: 2024-12-30

## 2025-02-19 ENCOUNTER — TELEPHONE (OUTPATIENT)
Dept: PSYCHIATRY | Facility: CLINIC | Age: 25
End: 2025-02-19

## 2025-02-19 ENCOUNTER — DOCUMENTATION (OUTPATIENT)
Dept: BEHAVIORAL/MENTAL HEALTH CLINIC | Facility: CLINIC | Age: 25
End: 2025-02-19

## 2025-02-19 NOTE — PROGRESS NOTES
Psychotherapy Discharge Summary    Preferred Name: Pablo Marlow  YOB: 2000    Admission date to psychotherapy: 9/9/24    Referred by: self/claimed doctor recommended    Presenting Problem: depression and anxiety    Course of treatment included : individual therapy     Progress/Outcome of Treatment Goals (brief summary of course of treatment) Ms. Marlow was seen for intake only. She canceled all subsequent appointments. Provider did place outreach call during an interim between appointments, and to offer an interim appointment.     Treatment Complications (if any): canceled appointments    Treatment Progress:  unable to assess due to limited contact    Current [facility] Provider: n/a    Discharge Medications include: n/a    Discharge Date: 2/19/25    Discharge Diagnosis: severe episode of major depression, without psychosis (as listed at time of intake)    Criteria for Discharge:  client canceled appointments and indicated 'feeling better'    Aftercare recommendations include (include specific referral names and phone numbers, if appropriate): resume therapy, link with psychiatry as determined    Prognosis:  unable to assess due to limited contact

## 2025-03-07 DIAGNOSIS — H65.93 FLUID LEVEL BEHIND TYMPANIC MEMBRANE OF BOTH EARS: Primary | ICD-10-CM

## 2025-03-17 ENCOUNTER — OFFICE VISIT (OUTPATIENT)
Dept: FAMILY MEDICINE CLINIC | Facility: CLINIC | Age: 25
End: 2025-03-17
Payer: COMMERCIAL

## 2025-03-17 VITALS
WEIGHT: 135.8 LBS | HEART RATE: 88 BPM | DIASTOLIC BLOOD PRESSURE: 74 MMHG | OXYGEN SATURATION: 98 % | BODY MASS INDEX: 24.99 KG/M2 | HEIGHT: 62 IN | RESPIRATION RATE: 16 BRPM | TEMPERATURE: 97.5 F | SYSTOLIC BLOOD PRESSURE: 108 MMHG

## 2025-03-17 DIAGNOSIS — Z13.1 SCREENING FOR DIABETES MELLITUS: ICD-10-CM

## 2025-03-17 DIAGNOSIS — Z11.3 SCREENING FOR STDS (SEXUALLY TRANSMITTED DISEASES): ICD-10-CM

## 2025-03-17 DIAGNOSIS — R53.83 OTHER FATIGUE: Primary | ICD-10-CM

## 2025-03-17 DIAGNOSIS — L65.9 HAIR LOSS: ICD-10-CM

## 2025-03-17 DIAGNOSIS — Z13.220 SCREENING FOR LIPID DISORDERS: ICD-10-CM

## 2025-03-17 PROCEDURE — 99214 OFFICE O/P EST MOD 30 MIN: CPT | Performed by: NURSE PRACTITIONER

## 2025-03-17 NOTE — PROGRESS NOTES
Name: Joselito Marlow      : 2000      MRN: [id number]  Encounter Provider: SOFI Diaz  Encounter Date: 3/17/2025   Encounter department: [facility]  :  Assessment & Plan  Other fatigue    Orders:    CBC and differential; Future    Iron Panel (Includes Ferritin, Iron Sat%, Iron, and TIBC); Future    TSH, 3rd generation with Free T4 reflex; Future    Vitamin D 25 hydroxy; Future    Vitamin B12; Future    Comprehensive metabolic panel; Future    Labs ordered for further evaluation. We will contact pt w/ results once available. She can continue current vitamin regimen at this time. Patient is encouraged to call our office for any questions/concerns, persistent or worsening symptoms. Patient states they understand and agree with treatment plan.   Hair loss    Orders:    CBC and differential; Future    Iron Panel (Includes Ferritin, Iron Sat%, Iron, and TIBC); Future    TSH, 3rd generation with Free T4 reflex; Future    Vitamin D 25 hydroxy; Future    Vitamin B12; Future    Comprehensive metabolic panel; Future    As noted above.  Screening for STDs (sexually transmitted diseases)    Orders:    HIV-1 RNA, Quantitative PCR, [facility]; Future    HSV TYPE 1,2 DNA PCR; Future    RPR-Syphilis Screening (Total Syphilis IGG/IGM); Future    Chlamydia/GC amplified DNA by PCR; Future    Screening for diabetes mellitus    Orders:    Hemoglobin A1C; Future    Screening for lipid disorders    Orders:    Lipid Panel with Direct LDL reflex; Future      Pt to f/u PRN.  F/u pending results.    Depression Screening and Follow-up Plan: Patient's depression screening was positive with a PHQ-9 score of 13.   Patient assessed for underlying major depression. Brief counseling provided and recommend additional follow-up/re-evaluation next office visit.       History of Present Illness   Pt presents today for concerns over hair loss and fatigue over the last several  "months.  Denies fever, joint pain.  Pt does notes hx of PCOS.  She is interested in having labs drawn. Previously she was low on vitamin D last year.      Review of Systems  As noted per HPI.  Objective   /74 (Patient Position: Sitting, Cuff Size: Standard)   Pulse 88   Temp 97.5 °F (36.4 °C) (Temporal)   Resp 16   Ht 5' 1.5\" (1.562 m)   Wt 61.6 kg (135 lb 12.8 oz)   SpO2 98%   BMI 25.24 kg/m²      Physical Exam  Vitals reviewed.   Constitutional:       Appearance: Normal appearance.   Pulmonary:      Effort: Pulmonary effort is normal.   Neurological:      Mental Status: She is alert and oriented to person, place, and time. Mental status is at baseline.   Psychiatric:         Mood and Affect: Mood normal.         Behavior: Behavior normal.         Thought Content: Thought content normal.         Judgment: Judgment normal.         "

## 2025-03-19 ENCOUNTER — APPOINTMENT (OUTPATIENT)
Dept: LAB | Facility: CLINIC | Age: 25
End: 2025-03-19
Payer: COMMERCIAL

## 2025-03-19 DIAGNOSIS — R53.83 OTHER FATIGUE: ICD-10-CM

## 2025-03-19 DIAGNOSIS — L65.9 HAIR LOSS: ICD-10-CM

## 2025-03-19 DIAGNOSIS — Z11.3 SCREENING FOR STDS (SEXUALLY TRANSMITTED DISEASES): ICD-10-CM

## 2025-03-19 DIAGNOSIS — Z13.1 SCREENING FOR DIABETES MELLITUS: ICD-10-CM

## 2025-03-19 LAB
25(OH)D3 SERPL-MCNC: 36.2 NG/ML (ref 30–100)
ALBUMIN SERPL BCG-MCNC: 4.2 G/DL (ref 3.5–5)
ALP SERPL-CCNC: 47 U/L (ref 34–104)
ALT SERPL W P-5'-P-CCNC: 14 U/L (ref 7–52)
ANION GAP SERPL CALCULATED.3IONS-SCNC: 6 MMOL/L (ref 4–13)
AST SERPL W P-5'-P-CCNC: 16 U/L (ref 13–39)
BASOPHILS # BLD AUTO: 0.03 THOUSANDS/ÂΜL (ref 0–0.1)
BASOPHILS NFR BLD AUTO: 0 % (ref 0–1)
BILIRUB SERPL-MCNC: 0.53 MG/DL (ref 0.2–1)
BUN SERPL-MCNC: 10 MG/DL (ref 5–25)
CALCIUM SERPL-MCNC: 9.2 MG/DL (ref 8.4–10.2)
CHLORIDE SERPL-SCNC: 105 MMOL/L (ref 96–108)
CO2 SERPL-SCNC: 28 MMOL/L (ref 21–32)
CREAT SERPL-MCNC: 0.74 MG/DL (ref 0.6–1.3)
EOSINOPHIL # BLD AUTO: 0.14 THOUSAND/ÂΜL (ref 0–0.61)
EOSINOPHIL NFR BLD AUTO: 2 % (ref 0–6)
ERYTHROCYTE [DISTWIDTH] IN BLOOD BY AUTOMATED COUNT: 11.8 % (ref 11.6–15.1)
EST. AVERAGE GLUCOSE BLD GHB EST-MCNC: 103 MG/DL
FERRITIN SERPL-MCNC: 31 NG/ML (ref 11–307)
GFR SERPL CREATININE-BSD FRML MDRD: 112 ML/MIN/1.73SQ M
GLUCOSE P FAST SERPL-MCNC: 84 MG/DL (ref 65–99)
HBA1C MFR BLD: 5.2 %
HCT VFR BLD AUTO: 40.3 % (ref 34.8–46.1)
HGB BLD-MCNC: 13.7 G/DL (ref 11.5–15.4)
IMM GRANULOCYTES # BLD AUTO: 0.03 THOUSAND/UL (ref 0–0.2)
IMM GRANULOCYTES NFR BLD AUTO: 0 % (ref 0–2)
IRON SATN MFR SERPL: 34 % (ref 15–50)
IRON SERPL-MCNC: 100 UG/DL (ref 50–212)
LYMPHOCYTES # BLD AUTO: 2.5 THOUSANDS/ÂΜL (ref 0.6–4.47)
LYMPHOCYTES NFR BLD AUTO: 36 % (ref 14–44)
MCH RBC QN AUTO: 30.5 PG (ref 26.8–34.3)
MCHC RBC AUTO-ENTMCNC: 34 G/DL (ref 31.4–37.4)
MCV RBC AUTO: 90 FL (ref 82–98)
MONOCYTES # BLD AUTO: 0.67 THOUSAND/ÂΜL (ref 0.17–1.22)
MONOCYTES NFR BLD AUTO: 10 % (ref 4–12)
NEUTROPHILS # BLD AUTO: 3.61 THOUSANDS/ÂΜL (ref 1.85–7.62)
NEUTS SEG NFR BLD AUTO: 52 % (ref 43–75)
NRBC BLD AUTO-RTO: 0 /100 WBCS
PLATELET # BLD AUTO: 294 THOUSANDS/UL (ref 149–390)
PMV BLD AUTO: 9.4 FL (ref 8.9–12.7)
POTASSIUM SERPL-SCNC: 3.9 MMOL/L (ref 3.5–5.3)
PROT SERPL-MCNC: 7.1 G/DL (ref 6.4–8.4)
RBC # BLD AUTO: 4.49 MILLION/UL (ref 3.81–5.12)
SODIUM SERPL-SCNC: 139 MMOL/L (ref 135–147)
TIBC SERPL-MCNC: 295.4 UG/DL (ref 250–450)
TRANSFERRIN SERPL-MCNC: 211 MG/DL (ref 203–362)
TREPONEMA PALLIDUM IGG+IGM AB [PRESENCE] IN SERUM OR PLASMA BY IMMUNOASSAY: NORMAL
TSH SERPL DL<=0.05 MIU/L-ACNC: 1.22 UIU/ML (ref 0.45–4.5)
UIBC SERPL-MCNC: 195 UG/DL (ref 155–355)
VIT B12 SERPL-MCNC: 253 PG/ML (ref 180–914)
WBC # BLD AUTO: 6.98 THOUSAND/UL (ref 4.31–10.16)

## 2025-03-19 PROCEDURE — 83540 ASSAY OF IRON: CPT

## 2025-03-19 PROCEDURE — 80053 COMPREHEN METABOLIC PANEL: CPT

## 2025-03-19 PROCEDURE — 85025 COMPLETE CBC W/AUTO DIFF WBC: CPT

## 2025-03-19 PROCEDURE — 82728 ASSAY OF FERRITIN: CPT

## 2025-03-19 PROCEDURE — 83550 IRON BINDING TEST: CPT

## 2025-03-19 PROCEDURE — 36415 COLL VENOUS BLD VENIPUNCTURE: CPT

## 2025-03-19 PROCEDURE — 87536 HIV-1 QUANT&REVRSE TRNSCRPJ: CPT

## 2025-03-19 PROCEDURE — 87591 N.GONORRHOEAE DNA AMP PROB: CPT

## 2025-03-19 PROCEDURE — 87491 CHLMYD TRACH DNA AMP PROBE: CPT

## 2025-03-19 PROCEDURE — 87529 HSV DNA AMP PROBE: CPT

## 2025-03-19 PROCEDURE — 82306 VITAMIN D 25 HYDROXY: CPT

## 2025-03-19 PROCEDURE — 86780 TREPONEMA PALLIDUM: CPT

## 2025-03-19 PROCEDURE — 82607 VITAMIN B-12: CPT

## 2025-03-19 PROCEDURE — 84443 ASSAY THYROID STIM HORMONE: CPT

## 2025-03-19 PROCEDURE — 83036 HEMOGLOBIN GLYCOSYLATED A1C: CPT

## 2025-03-20 LAB
C TRACH DNA SPEC QL NAA+PROBE: NEGATIVE
N GONORRHOEA DNA SPEC QL NAA+PROBE: NEGATIVE

## 2025-03-21 ENCOUNTER — RESULTS FOLLOW-UP (OUTPATIENT)
Dept: FAMILY MEDICINE CLINIC | Facility: CLINIC | Age: 25
End: 2025-03-21

## 2025-03-21 LAB
HIV1 RNA # PLAS NAA DL=20: NOT DETECTED {COPIES}/ML
HSV1 DNA SPEC QL NAA+PROBE: NEGATIVE
HSV2 DNA SPEC QL NAA+PROBE: NEGATIVE

## 2025-04-11 ENCOUNTER — HOSPITAL ENCOUNTER (EMERGENCY)
Facility: HOSPITAL | Age: 25
Discharge: HOME | End: 2025-04-11
Attending: EMERGENCY MEDICINE
Payer: COMMERCIAL

## 2025-04-11 VITALS
RESPIRATION RATE: 18 BRPM | TEMPERATURE: 97.6 F | BODY MASS INDEX: 26.5 KG/M2 | HEART RATE: 81 BPM | WEIGHT: 144 LBS | DIASTOLIC BLOOD PRESSURE: 63 MMHG | OXYGEN SATURATION: 100 % | SYSTOLIC BLOOD PRESSURE: 115 MMHG | HEIGHT: 62 IN

## 2025-04-11 DIAGNOSIS — S46.812A STRAIN OF LEFT TRAPEZIUS MUSCLE, INITIAL ENCOUNTER: ICD-10-CM
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
## 2025-04-11 PROCEDURE — 96372 THER/PROPH/DIAG INJ SC/IM: CPT

## 2025-04-11 PROCEDURE — 99284 EMERGENCY DEPT VISIT MOD MDM: CPT

## 2025-04-11 PROCEDURE — 99284 EMERGENCY DEPT VISIT MOD MDM: CPT | Performed by: EMERGENCY MEDICINE

## 2025-04-11 RX ORDER — METHOCARBAMOL 500 MG/1
500 TABLET, FILM COATED ORAL ONCE
Status: COMPLETED | OUTPATIENT
Start: 2025-04-11 | End: 2025-04-11

## 2025-04-11 RX ORDER — KETOROLAC TROMETHAMINE 30 MG/ML
15 INJECTION, SOLUTION INTRAMUSCULAR; INTRAVENOUS ONCE
Status: COMPLETED | OUTPATIENT
Start: 2025-04-11 | End: 2025-04-11

## 2025-04-11 RX ORDER — ACETAMINOPHEN 325 MG/1
650 TABLET ORAL ONCE
Status: COMPLETED | OUTPATIENT
Start: 2025-04-11 | End: 2025-04-11

## 2025-04-11 RX ORDER — LIDOCAINE 50 MG/G
1 PATCH TOPICAL ONCE
Status: DISCONTINUED | OUTPATIENT
Start: 2025-04-11 | End: 2025-04-12 | Stop reason: HOSPADM

## 2025-04-11 RX ADMIN — METHOCARBAMOL 500 MG: 500 TABLET ORAL at 18:49

## 2025-04-11 RX ADMIN — ACETAMINOPHEN 650 MG: 325 TABLET, FILM COATED ORAL at 18:50

## 2025-04-11 RX ADMIN — LIDOCAINE 1 PATCH: 700 PATCH TOPICAL at 18:49

## 2025-04-11 RX ADMIN — KETOROLAC TROMETHAMINE 15 MG: 30 INJECTION, SOLUTION INTRAMUSCULAR; INTRAVENOUS at 18:49
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
## 2025-05-27 DIAGNOSIS — Z00.6 ENCOUNTER FOR EXAMINATION FOR NORMAL COMPARISON OR CONTROL IN CLINICAL RESEARCH PROGRAM: ICD-10-CM

## 2025-05-30 ENCOUNTER — HOSPITAL ENCOUNTER (EMERGENCY)
Facility: HOSPITAL | Age: 25
Discharge: HOME | End: 2025-05-30
Attending: EMERGENCY MEDICINE | Admitting: EMERGENCY MEDICINE
Payer: COMMERCIAL

## 2025-05-30 ENCOUNTER — NURSE TRIAGE (OUTPATIENT)
Age: 25
End: 2025-05-30

## 2025-05-30 ENCOUNTER — APPOINTMENT (EMERGENCY)
Dept: RADIOLOGY | Facility: HOSPITAL | Age: 25
End: 2025-05-30
Payer: COMMERCIAL

## 2025-05-30 ENCOUNTER — OFFICE VISIT (OUTPATIENT)
Dept: URGENT CARE | Age: 25
End: 2025-05-30
Payer: COMMERCIAL

## 2025-05-30 VITALS
BODY MASS INDEX: 26.32 KG/M2 | SYSTOLIC BLOOD PRESSURE: 111 MMHG | DIASTOLIC BLOOD PRESSURE: 73 MMHG | RESPIRATION RATE: 20 BRPM | OXYGEN SATURATION: 100 % | WEIGHT: 141.6 LBS | HEART RATE: 77 BPM | TEMPERATURE: 96.6 F

## 2025-05-30 VITALS
RESPIRATION RATE: 18 BRPM | HEART RATE: 82 BPM | TEMPERATURE: 98.6 F | OXYGEN SATURATION: 97 % | SYSTOLIC BLOOD PRESSURE: 124 MMHG | DIASTOLIC BLOOD PRESSURE: 73 MMHG

## 2025-05-30 DIAGNOSIS — R10.2 PELVIC PAIN: Primary | ICD-10-CM

## 2025-05-30 DIAGNOSIS — R10.9 ABDOMINAL PAIN: Primary | ICD-10-CM

## 2025-05-30 LAB
ALBUMIN SERPL BCG-MCNC: 4.4 G/DL (ref 3.5–5)
ALP SERPL-CCNC: 46 U/L (ref 34–104)
ALT SERPL W P-5'-P-CCNC: 15 U/L (ref 7–52)
ANION GAP SERPL CALCULATED.3IONS-SCNC: 6 MMOL/L (ref 4–13)
AST SERPL W P-5'-P-CCNC: 16 U/L (ref 13–39)
BASOPHILS # BLD AUTO: 0.03 THOUSANDS/ÂΜL (ref 0–0.1)
BASOPHILS NFR BLD AUTO: 0 % (ref 0–1)
BILIRUB SERPL-MCNC: 0.42 MG/DL (ref 0.2–1)
BUN SERPL-MCNC: 9 MG/DL (ref 5–25)
CALCIUM SERPL-MCNC: 9.4 MG/DL (ref 8.4–10.2)
CHLORIDE SERPL-SCNC: 105 MMOL/L (ref 96–108)
CO2 SERPL-SCNC: 27 MMOL/L (ref 21–32)
CREAT SERPL-MCNC: 0.79 MG/DL (ref 0.6–1.3)
EOSINOPHIL # BLD AUTO: 0.09 THOUSAND/ÂΜL (ref 0–0.61)
EOSINOPHIL NFR BLD AUTO: 1 % (ref 0–6)
ERYTHROCYTE [DISTWIDTH] IN BLOOD BY AUTOMATED COUNT: 11.8 % (ref 11.6–15.1)
EXT PREGNANCY TEST URINE: NEGATIVE
EXT. CONTROL: NORMAL
GFR SERPL CREATININE-BSD FRML MDRD: 104 ML/MIN/1.73SQ M
GLUCOSE SERPL-MCNC: 87 MG/DL (ref 65–140)
HCT VFR BLD AUTO: 40.7 % (ref 34.8–46.1)
HGB BLD-MCNC: 14.3 G/DL (ref 11.5–15.4)
IMM GRANULOCYTES # BLD AUTO: 0.03 THOUSAND/UL (ref 0–0.2)
IMM GRANULOCYTES NFR BLD AUTO: 0 % (ref 0–2)
LYMPHOCYTES # BLD AUTO: 3.26 THOUSANDS/ÂΜL (ref 0.6–4.47)
LYMPHOCYTES NFR BLD AUTO: 33 % (ref 14–44)
MCH RBC QN AUTO: 30.8 PG (ref 26.8–34.3)
MCHC RBC AUTO-ENTMCNC: 35.1 G/DL (ref 31.4–37.4)
MCV RBC AUTO: 88 FL (ref 82–98)
MONOCYTES # BLD AUTO: 0.88 THOUSAND/ÂΜL (ref 0.17–1.22)
MONOCYTES NFR BLD AUTO: 9 % (ref 4–12)
NEUTROPHILS # BLD AUTO: 5.56 THOUSANDS/ÂΜL (ref 1.85–7.62)
NEUTS SEG NFR BLD AUTO: 57 % (ref 43–75)
NRBC BLD AUTO-RTO: 0 /100 WBCS
PLATELET # BLD AUTO: 291 THOUSANDS/UL (ref 149–390)
PMV BLD AUTO: 9 FL (ref 8.9–12.7)
POTASSIUM SERPL-SCNC: 3.9 MMOL/L (ref 3.5–5.3)
PROT SERPL-MCNC: 7.2 G/DL (ref 6.4–8.4)
RBC # BLD AUTO: 4.64 MILLION/UL (ref 3.81–5.12)
SL AMB  POCT GLUCOSE, UA: NORMAL
SL AMB LEUKOCYTE ESTERASE,UA: NORMAL
SL AMB POCT BILIRUBIN,UA: NORMAL
SL AMB POCT BLOOD,UA: NORMAL
SL AMB POCT CLARITY,UA: CLEAR
SL AMB POCT COLOR,UA: YELLOW
SL AMB POCT KETONES,UA: NORMAL
SL AMB POCT NITRITE,UA: NORMAL
SL AMB POCT PH,UA: 5
SL AMB POCT SPECIFIC GRAVITY,UA: 1
SL AMB POCT URINE HCG: NORMAL
SL AMB POCT URINE PROTEIN: NORMAL
SL AMB POCT UROBILINOGEN: 0.2
SODIUM SERPL-SCNC: 138 MMOL/L (ref 135–147)
WBC # BLD AUTO: 9.85 THOUSAND/UL (ref 4.31–10.16)

## 2025-05-30 PROCEDURE — 81025 URINE PREGNANCY TEST: CPT

## 2025-05-30 PROCEDURE — 76856 US EXAM PELVIC COMPLETE: CPT

## 2025-05-30 PROCEDURE — 99284 EMERGENCY DEPT VISIT MOD MDM: CPT

## 2025-05-30 PROCEDURE — 85025 COMPLETE CBC W/AUTO DIFF WBC: CPT

## 2025-05-30 PROCEDURE — 81002 URINALYSIS NONAUTO W/O SCOPE: CPT | Performed by: NURSE PRACTITIONER

## 2025-05-30 PROCEDURE — 99212 OFFICE O/P EST SF 10 MIN: CPT | Performed by: NURSE PRACTITIONER

## 2025-05-30 PROCEDURE — 76830 TRANSVAGINAL US NON-OB: CPT

## 2025-05-30 PROCEDURE — 99284 EMERGENCY DEPT VISIT MOD MDM: CPT | Performed by: EMERGENCY MEDICINE

## 2025-05-30 PROCEDURE — 80053 COMPREHEN METABOLIC PANEL: CPT

## 2025-05-30 PROCEDURE — 81025 URINE PREGNANCY TEST: CPT | Performed by: NURSE PRACTITIONER

## 2025-05-30 PROCEDURE — 36415 COLL VENOUS BLD VENIPUNCTURE: CPT

## 2025-05-30 NOTE — PATIENT INSTRUCTIONS
Recommend further evaluation in the emergency room as your concern is the pain has been steadily increasing over the last 2 weeks.

## 2025-05-30 NOTE — PROGRESS NOTES
Name: Joselito Marlow      : 2000      MRN: [id number]  Encounter Provider: SOFI Ackerman  Encounter Date: 2025   Encounter department: [facility]  :  Assessment & Plan  Pelvic pain  Urine dip negative for leuks, nitrates or blood.  Urine pregnancy negative  Recommend further evaluation in the emergency room due to steady increase in pain and inability to do further testing in urgent care.     Orders:    POCT urine dip    POCT urine HCG    Transfer to other facility        History of Present Illness   Pelvic Pain  The patient's primary symptoms include pelvic pain. Pertinent negatives include no abdominal pain, back pain, chills, dysuria, fever, hematuria, rash, sore throat or vomiting.     Joselito Marlow is a 25 y.o. female who presents for evaluation of pelvic pain.  Patient reports bilateral CVA tenderness on and off for a month.  She states it does happen daily and it may only be a minute or 2 and then it goes away but has been pretty consistent throughout the last month.  She has a history of PCOS and states she has not had a pelvic exam since 2019.  She reports her mom has a history of ovarian cancer.  She called her OB/GYN today to get her in for an appointment but they had nothing available.  She has a hard time getting into see them because of her work schedule.             Review of Systems   Constitutional:  Negative for chills and fever.   HENT:  Negative for ear pain and sore throat.    Eyes:  Negative for pain and visual disturbance.   Respiratory:  Negative for cough and shortness of breath.    Cardiovascular:  Negative for chest pain and palpitations.   Gastrointestinal:  Negative for abdominal pain and vomiting.   Genitourinary:  Positive for pelvic pain. Negative for dysuria and hematuria.   Musculoskeletal:  Negative for arthralgias and back pain.   Skin:  Negative for color change and rash.   Neurological:  Negative for seizures and syncope.    All other systems reviewed and are negative.         Objective   /73   Pulse 77   Temp (!) 96.6 °F (35.9 °C) (Tympanic)   Resp 20   Wt 64.2 kg (141 lb 9.6 oz)   LMP 04/08/2025 (Exact Date)   SpO2 100%   BMI 26.32 kg/m²      Physical Exam  Vitals and nursing note reviewed.   Constitutional:       General: She is not in acute distress.     Appearance: She is well-developed.   HENT:      Head: Normocephalic and atraumatic.     Eyes:      Conjunctiva/sclera: Conjunctivae normal.       Cardiovascular:      Rate and Rhythm: Normal rate and regular rhythm.      Heart sounds: No murmur heard.  Pulmonary:      Effort: Pulmonary effort is normal. No respiratory distress.      Breath sounds: Normal breath sounds.   Abdominal:      General: Bowel sounds are normal. There is no distension.      Palpations: Abdomen is soft.      Tenderness: There is abdominal tenderness in the right lower quadrant, suprapubic area and left lower quadrant. There is right CVA tenderness, left CVA tenderness and guarding.   Genitourinary:     Labia:         Right: No tenderness.         Left: No tenderness.      Musculoskeletal:         General: No swelling.      Cervical back: Neck supple.     Skin:     General: Skin is warm and dry.      Capillary Refill: Capillary refill takes less than 2 seconds.     Neurological:      Mental Status: She is alert.     Psychiatric:         Mood and Affect: Mood normal.

## 2025-05-30 NOTE — TELEPHONE ENCOUNTER
"REASON FOR CONVERSATION: Pelvic Pain    SYMPTOMS: c/o intermittent pain over ovaries, comes and goes and changes in severity, at worst 4/10.  pain last  x 3 minutes or less, denise pain L>R. Denies bleeding, no fever, no vaginal discharge , no odor, no UTI s/sx. Patient asked for appointment today, offered first available Monday 6/2 and patient declined, states she will go to  first.    OTHER HEALTH INFORMATION: last office appointment 9/2024 Dr. Garvin    PROTOCOL DISPOSITION: See Today or Tomorrow in Office- patient will go to  now     CARE ADVICE PROVIDED: unable to schedule office appointment today, patient states she will go to .     PRACTICE FOLLOW-UP: none      Reason for Disposition   Patient wants to be seen   MILD to MODERATE pain that comes and goes (cramps) and present > 48 hours    Answer Assessment - Initial Assessment Questions  1. LOCATION: \"Where does it hurt?\"       Pelvic pain over ovaries,  L>R, comes and goes, and changes in intensity, pain x 1 month, no vaginal odor, no discharge and no bleeding, no fever.     2. RADIATION: \"Does the pain shoot anywhere else?\" (e.g., lower back, groin, thighs)      Denies   3. ONSET: \"When did the pain begin?\" (e.g., minutes, hours or days ago)        x1 month   4. SUDDEN: \"Gradual or sudden onset?\"      Sudden   5. PATTERN \"Does the pain come and go, or is it constant?\"      Comes and goes  6. SEVERITY: \"How bad is the pain?\"  (e.g., Scale 1-10; mild, moderate, or severe)      Sharp and stabbing at times, at worse 4/10, last 3 minutes or less, occurring more often    7. RECURRENT SYMPTOM: \"Have you ever had this type of pelvic pain before?\" If Yes, ask: \"When was the last time?\" and \"What happened that time?\"       no  8. CAUSE: \"What do you think is causing the pelvic pain?\"      Unsure   9. RELIEVING/AGGRAVATING FACTORS: \"What makes it better or worse?\" (e.g., activity/rest, sexual intercourse, voiding, passing stool)      OTC meds and heat pad doesn't " "really work   10. OTHER SYMPTOMS: \"Has there been any other symptoms?\" (e.g., fever, constipation, diarrhea, urine problems, vaginal bleeding, vaginal discharge, or vomiting?\"        Denies   11. PREGNANCY: \"Is there any chance you are pregnant?\" \"When was your last menstrual period?\"        LMP 4/ 8/25 x 4 days, periods are light    Protocols used: Pelvic Pain - Female-Adult-[place]    "

## 2025-05-31 NOTE — ED CARE HANDOFF
Emergency Department Sign Out Note        Sign out and transfer of care from Dr. Tono Gant. See Separate Emergency Department note.     The patient, Joselito Marlow, was evaluated by the previous provider for pelvic pain.    Workup Completed:  Labs Reviewed   POCT PREGNANCY, URINE - Normal       Result Value Ref Range Status    EXT Preg Test, Ur Negative  Negative Final    Control Valid  Valid Final   CBC AND DIFFERENTIAL    WBC 9.85  4.31 - 10.16 Thousand/uL Final    RBC 4.64  3.81 - 5.12 Million/uL Final    Hemoglobin 14.3  11.5 - 15.4 g/dL Final    Hematocrit 40.7  34.8 - 46.1 % Final    MCV 88  82 - 98 fL Final    MCH 30.8  26.8 - 34.3 pg Final    MCHC 35.1  31.4 - 37.4 g/dL Final    RDW 11.8  11.6 - 15.1 % Final    MPV 9.0  8.9 - 12.7 fL Final    Platelets 291  149 - 390 Thousands/uL Final    nRBC 0  /100 WBCs Final    Segmented % 57  43 - 75 % Final    Immature Grans % 0  0 - 2 % Final    Lymphocytes % 33  14 - 44 % Final    Monocytes % 9  4 - 12 % Final    Eosinophils Relative 1  0 - 6 % Final    Basophils Relative 0  0 - 1 % Final    Absolute Neutrophils 5.56  1.85 - 7.62 Thousands/µL Final    Absolute Immature Grans 0.03  0.00 - 0.20 Thousand/uL Final    Absolute Lymphocytes 3.26  0.60 - 4.47 Thousands/µL Final    Absolute Monocytes 0.88  0.17 - 1.22 Thousand/µL Final    Eosinophils Absolute 0.09  0.00 - 0.61 Thousand/µL Final    Basophils Absolute 0.03  0.00 - 0.10 Thousands/µL Final   COMPREHENSIVE METABOLIC PANEL    Sodium 138  135 - 147 mmol/L Final    Potassium 3.9  3.5 - 5.3 mmol/L Final    Chloride 105  96 - 108 mmol/L Final    CO2 27  21 - 32 mmol/L Final    ANION GAP 6  4 - 13 mmol/L Final    BUN 9  5 - 25 mg/dL Final    Creatinine 0.79  0.60 - 1.30 mg/dL Final    Comment: Standardized to IDMS reference method    Glucose 87  65 - 140 mg/dL Final    Comment: If the patient is fasting, the ADA then defines impaired fasting glucose as > 100 mg/dL and diabetes as > or equal to 123 mg/dL.     Calcium 9.4  8.4 - 10.2 mg/dL Final    AST 16  13 - 39 U/L Final    ALT 15  7 - 52 U/L Final    Comment: Specimen collection should occur prior to Sulfasalazine administration due to the potential for falsely depressed results.     Alkaline Phosphatase 46  34 - 104 U/L Final    Total Protein 7.2  6.4 - 8.4 g/dL Final    Albumin 4.4  3.5 - 5.0 g/dL Final    Total Bilirubin 0.42  0.20 - 1.00 mg/dL Final    Comment: Use of this assay is not recommended for patients undergoing treatment with eltrombopag due to the potential for falsely elevated results.  N-acetyl-p-benzoquinone imine (metabolite of Acetaminophen) will generate erroneously low results in samples for patients that have taken an overdose of Acetaminophen.    eGFR 104  ml/min/1.73sq m Final    Narrative:     National Kidney Disease Foundation guidelines for Chronic Kidney Disease (CKD):     Stage 1 with normal or high GFR (GFR > 90 mL/min/1.73 square meters)    Stage 2 Mild CKD (GFR = 60-89 mL/min/1.73 square meters)    Stage 3A Moderate CKD (GFR = 45-59 mL/min/1.73 square meters)    Stage 3B Moderate CKD (GFR = 30-44 mL/min/1.73 square meters)    Stage 4 Severe CKD (GFR = 15-29 mL/min/1.73 square meters)    Stage 5 End Stage CKD (GFR <15 mL/min/1.73 square meters)  Note: GFR calculation is accurate only with a steady state creatinine           ED Course / Workup Pending (followup):  Patient was signed out to me pending pelvic ultrasound.    US pelvis complete w transvaginal   Final Result      No evidence of ovarian torsion.         Workstation performed: [id number]           No evidence of ovarian torsion on pelvic ultrasound.  Patient will be discharged to home with expectant care and recommendations to follow-up with OB/GYN.          No data recorded                             Procedures  Medical Decision Making  Amount and/or Complexity of Data Reviewed  Labs: ordered.  Radiology: ordered.            Disposition  Final diagnoses:   Abdominal pain      Time reflects when diagnosis was documented in both MDM as applicable and the Disposition within this note       Time User Action Codes Description Comment    5/30/2025 10:46 PM Eliseo Conner [R10.9] Abdominal pain           ED Disposition       ED Disposition   Discharge    Condition   Stable    Date/Time   Fri May 30, 2025 10:46 PM    Comment   Joselito Marlow discharge to home/self care.                   Follow-up Information       Follow up With Specialties Details Why Contact Info    [facility]  As needed [address]  [address] [place] [address]  [phone]            Discharge Medication List as of 5/30/2025 10:47 PM        CONTINUE these medications which have NOT CHANGED    Details   clotrimazole-betamethasone (LOTRISONE) 1-0.05 % cream Apply topically 2 (two) times a day for 7 days, Starting Mon 12/23/2024, Until Mon 12/30/2024, Normal      triamcinolone (KENALOG) 0.1 % ointment Apply topically 2 (two) times a day To the affected areas for up to 14 days. Do not apply to the face, armpits, or groin., Starting Mon 8/7/2023, Normal           No discharge procedures on file.       ED Provider  Electronically Signed by     Jason Schmitt MD  05/30/25 [id number]

## 2025-05-31 NOTE — DISCHARGE INSTRUCTIONS
You were seen in the emergency department for abdominal pain.  Your ultrasound of your ovaries and uterus did not reveal any cause for this pain.  It did show small cysts which is not a problem right now but could be in the future and just have it be monitored by your primary care doctor.  Additionally your labs did not reveal any abnormalities.  We are sending you a gastroenterology referral in case your symptoms do not resolve as this could be secondary to that.  However we recommend that you follow-up with your primary care doctor before attending to that referral.  Please continue to increase fiber in your diet as this may help if you are having constipation issues.

## 2025-05-31 NOTE — ED ATTENDING ATTESTATION
2025  I, Tono Gant MD, saw and evaluated the patient. I have discussed the patient with the resident/non-physician practitioner and agree with the resident's/non-physician practitioner's findings, Plan of Care, and MDM as documented in the resident's/non-physician practitioner's note, except where noted. All available labs and Radiology studies were reviewed.  I was present for key portions of any procedure(s) performed by the resident/non-physician practitioner and I was immediately available to provide assistance.       At this point I agree with the current assessment done in the Emergency Department.  I have conducted an independent evaluation of this patient a history and physical is as follows:    ED Course   Emergency Department Note- Joselito Marlow 25 y.o. female MRN: [id number]    Unit/Bed#: [facility] Encounter: [id number]    Joselito Marlow is a 25 y.o. female who presents with   Chief Complaint   Patient presents with    Pelvic Pain     Pt. C/o pelvic pain for the last month that happens randomly throughout the day, sent from [facility]. Preg neg and urine neg. History PCOS since 2019. Pt. Feels the stabbing pain around 10 times a day. -abd pain, -n/v/d.          History of Present Illness   HPI:  Joselito Marlow is a 25 y.o. female who presents for evaluation of:  Intermittent pelvic pain over the last month.  Typically the pelvic pains are bilateral, in the suprapubic area and radiate to her right and her left pelvis area.  The pains are sharp, mild to moderate in intensity, and then resolved.  Movement does not trigger the discomfort; rest does not palliate the discomfort.  Patient notes that her last menstrual period was in April 10, 2025.  Patient does not use any sort of birth control.  Patient is a  2 para 2-0-0-2; status postnormal spontaneous vaginal livery x 2.  She denies associated nausea and vomiting.  She denies any associated radiation of the discomfort to her  right and her left flank area.  She is currently not having any discomfort.    Review of Systems   Constitutional:  Negative for fatigue and fever.   HENT:  Negative for congestion and sore throat.    Respiratory:  Negative for cough and shortness of breath.    Cardiovascular:  Negative for chest pain and palpitations.   Gastrointestinal:  Negative for abdominal pain and nausea.   Genitourinary:  Positive for pelvic pain. Negative for flank pain and frequency.   Neurological:  Negative for light-headedness and headaches.   Psychiatric/Behavioral:  Negative for dysphoric mood and hallucinations.    All other systems reviewed and are negative.      Historical Information   Past Medical History[1]  Past Surgical History[2]  Social History   Social History     Substance and Sexual Activity   Alcohol Use Not Currently     Social History     Substance and Sexual Activity   Drug Use No     Tobacco Use History[3]  Family History: Family History[4]    Meds/Allergies   PTA meds:   Prior to Admission Medications   Prescriptions Last Dose Informant Patient Reported? Taking?   clotrimazole-betamethasone (LOTRISONE) 1-0.05 % cream   No No   Sig: Apply topically 2 (two) times a day for 7 days   triamcinolone (KENALOG) 0.1 % ointment   No No   Sig: Apply topically 2 (two) times a day To the affected areas for up to 14 days. Do not apply to the face, armpits, or groin.      Facility-Administered Medications: None     Allergies[5]    Objective   First Vitals:   Blood Pressure: 119/74 (05/30/25 1714)  Pulse: 81 (05/30/25 1714)  Temperature: 98.6 °F (37 °C) (05/30/25 1714)  Temp Source: Temporal (05/30/25 1714)  Respirations: 18 (05/30/25 1714)  SpO2: 99 % (05/30/25 1714)    Current Vitals:   Blood Pressure: 119/74 (05/30/25 1714)  Pulse: 81 (05/30/25 1714)  Temperature: 98.6 °F (37 °C) (05/30/25 1714)  Temp Source: Temporal (05/30/25 1714)  Respirations: 18 (05/30/25 1714)  SpO2: 99 % (05/30/25 1714)    No intake or output data in the  24 hours ending 25    Invasive Devices       None                   Physical Exam  Vitals and nursing note reviewed.   Constitutional:       General: She is not in acute distress.     Appearance: Normal appearance. She is well-developed.   HENT:      Head: Normocephalic and atraumatic.      Right Ear: External ear normal.      Left Ear: External ear normal.      Nose: Nose normal.      Mouth/Throat:      Pharynx: No oropharyngeal exudate.     Eyes:      Conjunctiva/sclera: Conjunctivae normal.      Pupils: Pupils are equal, round, and reactive to light.       Cardiovascular:      Rate and Rhythm: Normal rate and regular rhythm.   Pulmonary:      Effort: Pulmonary effort is normal. No respiratory distress.   Abdominal:      General: Abdomen is flat. There is no distension.      Palpations: Abdomen is soft.     Musculoskeletal:         General: No deformity. Normal range of motion.      Cervical back: Normal range of motion and neck supple.     Skin:     General: Skin is warm and dry.      Capillary Refill: Capillary refill takes less than 2 seconds.     Neurological:      General: No focal deficit present.      Mental Status: She is alert and oriented to person, place, and time. Mental status is at baseline.      Coordination: Coordination normal.     Psychiatric:         Mood and Affect: Mood normal.         Behavior: Behavior normal.         Thought Content: Thought content normal.         Judgment: Judgment normal.           Medical Decision Makin.  Acute pelvic pain suprapubic radiating to her right and left pelvis: Plan pelvic ultrasound to rule out ovarian torsion and ovarian cysts.  Urinalysis done earlier today this afternoon shows no evidence of urinary infection; urine hCG is negative.  Plan to obtain a CBC to rule out leukocytosis and anemia; basic metabolic profile to rule out electrolyte disturbance, uremia, and disorders of glucose homeostasis; pain control if there is any recrudescence  "of the pain.    Recent Results (from the past 36 hours)   POCT urine dip    Collection Time: 05/30/25  4:25 PM   Result Value Ref Range    LEUKOCYTE ESTERASE,UA neg     NITRITE,UA neg     SL AMB POCT UROBILINOGEN 0.2     POCT URINE PROTEIN neg      PH,UA 5.0     BLOOD,UA neg     SPECIFIC GRAVITY,UA 1.000     KETONES,UA neg     BILIRUBIN,UA neg     GLUCOSE, UA neg      COLOR,UA yellow     CLARITY,UA clear    POCT urine HCG    Collection Time: 05/30/25  4:26 PM   Result Value Ref Range    URINE HCG neg      US pelvis limited non OB    (Results Pending)         Portions of the record may have been created with voice recognition software. Occasional wrong word or \"sound a like\" substitutions may have occurred due to the inherent limitations of voice recognition software.  Read the chart carefully and recognize, using context, where substitutions have occurred.          Critical Care Time  Procedures           [1]   Past Medical History:  Diagnosis Date    Anxiety     Depression     Diet controlled gestational diabetes mellitus (GDM) in third trimester 06/16/2022    History of intrauterine growth restriction in prior pregnancy, currently pregnant, first trimester 01/19/2022    Weighed 5 pounds at 35 weeks and 6 days    Hypertension 07/04/2018    PCOS (polycystic ovarian syndrome)     Preeclampsia, severe, third trimester 07/04/2018    with this pregnancy    Preeclampsia, third trimester 06/16/2022    Varicella     had vaccine   [2]   Past Surgical History:  Procedure Laterality Date    MOLE REMOVAL Left     arm    WISDOM TOOTH EXTRACTION Bilateral 09/07/2021   [3]   Social History  Tobacco Use   Smoking Status Never   Smokeless Tobacco Never   [4]   Family History  Problem Relation Name Age of Onset    Cancer Mother Oliva Martinez         ovarian    Asthma Mother Oliva Martinez     Ovarian cancer Mother Oliva Martinez     Diabetes Father      Sleep apnea Father      Seizures Sister Raghav Jordan         2022    ADD / ADHD " Brother      Anxiety disorder Brother      Self-Injury Brother      Suicide Attempts Brother      Seizures Son Pradeep Longoria         9/8/22    Diabetes Maternal Grandmother Falguni     Breast cancer Maternal Grandmother Falguni         2023    Alcohol abuse Paternal Grandfather unknown     Substance Abuse Maternal Uncle Ki     Substance Abuse Maternal Uncle John     Alcohol abuse Paternal Uncle Hi    [5]   Allergies  Allergen Reactions    Medical Tape Itching, Rash and Swelling

## 2025-06-08 NOTE — ED PROVIDER NOTES
Time reflects when diagnosis was documented in both MDM as applicable and the Disposition within this note       Time User Action Codes Description Comment    5/30/2025 10:46 PM Eliseo Conner Add [R10.9] Abdominal pain           ED Disposition       ED Disposition   Discharge    Condition   Stable    Date/Time   Fri May 30, 2025 10:46 PM    Comment   Joselito Marlow discharge to home/self care.                   Assessment & Plan       Medical Decision Making  Abdominal exam extremely reassuring will obtain basic labs including urine pregnancy test.  Will also get a pelvic ultrasound to look for any evidence of intermittent torsion.  Patient not requiring pain medication at this time.    Labs did not reveal any source of the pelvic pain possibly due to her PCOS, given reassuring labs and imaging study will discharge at this time and have her follow-up with OB/GYN.  Patient agreeable to this.  At this time she will be discharged with return precautions and follow-up instructions.    Amount and/or Complexity of Data Reviewed  Labs: ordered.  Radiology: ordered.             Medications - No data to display    ED Risk Strat Scores                    No data recorded                            History of Present Illness       Chief Complaint   Patient presents with    Pelvic Pain     Pt. C/o pelvic pain for the last month that happens randomly throughout the day, sent from [facility]. Preg neg and urine neg. History PCOS since 2019. Pt. Feels the stabbing pain around 10 times a day. -abd pain, -n/v/d.        Past Medical History[1]   Past Surgical History[2]   Family History[3]   Social History[4]   E-Cigarette/Vaping    E-Cigarette Use Never User       E-Cigarette/Vaping Substances    Nicotine No     THC No     CBD No     Flavoring No     Other No     Unknown No       I have reviewed and agree with the history as documented.     25-year-old female past medical history of PCOS, presenting the emergency department  for pelvic pain.  She was sent by the [facility] urgent care.  Patient does have a history of the PCOS this been going on from 2019.  She says that the pain in her lower abdomen is sharp in nature.  Patient does not have any other abdominal pain.  She does not have any nausea vomiting or diarrhea.  Denies any fever, chills, chest pain, shortness of breath, dysuria, vaginal bleeding/discharge.      Pelvic Pain      Review of Systems   Genitourinary:  Positive for pelvic pain.           Objective       ED Triage Vitals [05/30/25 1714]   Temperature Pulse Blood Pressure Respirations SpO2 Patient Position - Orthostatic VS   98.6 °F (37 °C) 81 119/74 18 99 % Sitting      Temp Source Heart Rate Source BP Location FiO2 (%) Pain Score    Temporal Monitor Left arm -- 5      Vitals      Date and Time Temp Pulse SpO2 Resp BP Pain Score FACES Pain Rating User   05/30/25 2235 -- 82 97 % 18 124/73 -- -- FR   05/30/25 2024 -- 80 99 % 18 122/76 -- -- FR   05/30/25 1714 98.6 °F (37 °C) 81 99 % 18 119/74 5 -- BK            Physical Exam  Vitals and nursing note reviewed.   Constitutional:       Appearance: Normal appearance. She is well-developed.   HENT:      Head: Normocephalic and atraumatic.      Nose: Nose normal.      Mouth/Throat:      Mouth: Mucous membranes are moist.      Pharynx: No oropharyngeal exudate or posterior oropharyngeal erythema.     Eyes:      Extraocular Movements: Extraocular movements intact.      Conjunctiva/sclera: Conjunctivae normal.      Pupils: Pupils are equal, round, and reactive to light.       Cardiovascular:      Rate and Rhythm: Normal rate and regular rhythm.      Pulses: Normal pulses.      Heart sounds: Normal heart sounds.   Pulmonary:      Effort: Pulmonary effort is normal.      Breath sounds: Normal breath sounds.   Abdominal:      General: Bowel sounds are normal. There is no distension.      Palpations: Abdomen is soft.      Tenderness: There is no abdominal tenderness. There is no  guarding or rebound.     Musculoskeletal:         General: Normal range of motion.      Cervical back: Normal range of motion and neck supple.      Right lower leg: No edema.      Left lower leg: No edema.     Skin:     General: Skin is warm and dry.      Capillary Refill: Capillary refill takes less than 2 seconds.     Neurological:      General: No focal deficit present.      Mental Status: She is alert and oriented to person, place, and time.      Cranial Nerves: No cranial nerve deficit.     Psychiatric:         Mood and Affect: Mood normal.         Behavior: Behavior normal.         Results Reviewed       Procedure Component Value Units Date/Time    POCT pregnancy, urine [[id number]]  (Normal) Collected: 05/30/25 2118    Lab Status: Final result Updated: 05/30/25 2118     EXT Preg Test, Ur Negative     Control Valid    Comprehensive metabolic panel [[id number]] Collected: 05/30/25 2016    Lab Status: Final result Specimen: Blood from Arm, Right Updated: 05/30/25 2045     Sodium 138 mmol/L      Potassium 3.9 mmol/L      Chloride 105 mmol/L      CO2 27 mmol/L      ANION GAP 6 mmol/L      BUN 9 mg/dL      Creatinine 0.79 mg/dL      Glucose 87 mg/dL      Calcium 9.4 mg/dL      AST 16 U/L      ALT 15 U/L      Alkaline Phosphatase 46 U/L      Total Protein 7.2 g/dL      Albumin 4.4 g/dL      Total Bilirubin 0.42 mg/dL      eGFR 104 ml/min/1.73sq m     Narrative:      National Kidney Disease Foundation guidelines for Chronic Kidney Disease (CKD):     Stage 1 with normal or high GFR (GFR > 90 mL/min/1.73 square meters)    Stage 2 Mild CKD (GFR = 60-89 mL/min/1.73 square meters)    Stage 3A Moderate CKD (GFR = 45-59 mL/min/1.73 square meters)    Stage 3B Moderate CKD (GFR = 30-44 mL/min/1.73 square meters)    Stage 4 Severe CKD (GFR = 15-29 mL/min/1.73 square meters)    Stage 5 End Stage CKD (GFR <15 mL/min/1.73 square meters)  Note: GFR calculation is accurate only with a steady state creatinine    CBC and differential  [[id number]] Collected: 05/30/25 2016    Lab Status: Final result Specimen: Blood from Arm, Right Updated: 05/30/25 2027     WBC 9.85 Thousand/uL      RBC 4.64 Million/uL      Hemoglobin 14.3 g/dL      Hematocrit 40.7 %      MCV 88 fL      MCH 30.8 pg      MCHC 35.1 g/dL      RDW 11.8 %      MPV 9.0 fL      Platelets 291 Thousands/uL      nRBC 0 /100 WBCs      Segmented % 57 %      Immature Grans % 0 %      Lymphocytes % 33 %      Monocytes % 9 %      Eosinophils Relative 1 %      Basophils Relative 0 %      Absolute Neutrophils 5.56 Thousands/µL      Absolute Immature Grans 0.03 Thousand/uL      Absolute Lymphocytes 3.26 Thousands/µL      Absolute Monocytes 0.88 Thousand/µL      Eosinophils Absolute 0.09 Thousand/µL      Basophils Absolute 0.03 Thousands/µL             US pelvis complete w transvaginal   Final Interpretation by Kashif Garland MD (05/30 2229)      No evidence of ovarian torsion.         Workstation performed: [id number]             Procedures    ED Medication and Procedure Management   Prior to Admission Medications   Prescriptions Last Dose Informant Patient Reported? Taking?   clotrimazole-betamethasone (LOTRISONE) 1-0.05 % cream   No No   Sig: Apply topically 2 (two) times a day for 7 days   triamcinolone (KENALOG) 0.1 % ointment   No No   Sig: Apply topically 2 (two) times a day To the affected areas for up to 14 days. Do not apply to the face, armpits, or groin.      Facility-Administered Medications: None     Discharge Medication List as of 5/30/2025 10:47 PM        CONTINUE these medications which have NOT CHANGED    Details   clotrimazole-betamethasone (LOTRISONE) 1-0.05 % cream Apply topically 2 (two) times a day for 7 days, Starting Mon 12/23/2024, Until Mon 12/30/2024, Normal      triamcinolone (KENALOG) 0.1 % ointment Apply topically 2 (two) times a day To the affected areas for up to 14 days. Do not apply to the face, armpits, or groin., Starting Mon 8/7/2023, Normal           No  discharge procedures on file.  ED SEPSIS DOCUMENTATION   Time reflects when diagnosis was documented in both MDM as applicable and the Disposition within this note       Time User Action Codes Description Comment    5/30/2025 10:46 PM Eliseo Conner [R10.9] Abdominal pain                    [1]   Past Medical History:  Diagnosis Date    Anxiety     Depression     Diet controlled gestational diabetes mellitus (GDM) in third trimester 06/16/2022    History of intrauterine growth restriction in prior pregnancy, currently pregnant, first trimester 01/19/2022    Weighed 5 pounds at 35 weeks and 6 days    Hypertension 07/04/2018    PCOS (polycystic ovarian syndrome)     Preeclampsia, severe, third trimester 07/04/2018    with this pregnancy    Preeclampsia, third trimester 06/16/2022    Varicella     had vaccine   [2]   Past Surgical History:  Procedure Laterality Date    MOLE REMOVAL Left     arm    WISDOM TOOTH EXTRACTION Bilateral 09/07/2021   [3]   Family History  Problem Relation Name Age of Onset    Cancer Mother Oliva Martinez         ovarian    Asthma Mother Oliva Martinez     Ovarian cancer Mother Oliva Martinez     Diabetes Father      Sleep apnea Father      Seizures Sister Raghav Jordan         2022    ADD / ADHD Brother      Anxiety disorder Brother      Self-Injury Brother      Suicide Attempts Brother      Seizures Son Pradeep Longoria         9/8/22    Diabetes Maternal Grandmother Falguni     Breast cancer Maternal Grandmother Falguni         2023    Alcohol abuse Paternal Grandfather unknown     Substance Abuse Maternal Uncle Ki     Substance Abuse Maternal Uncle John     Alcohol abuse Paternal Uncle Hi    [4]   Social History  Tobacco Use    Smoking status: Never    Smokeless tobacco: Never   Vaping Use    Vaping status: Never Used   Substance Use Topics    Alcohol use: Not Currently    Drug use: No        Eliseo Conner MD  06/07/25 [id number]

## 2025-06-19 ENCOUNTER — APPOINTMENT (OUTPATIENT)
Dept: LAB | Facility: CLINIC | Age: 25
End: 2025-06-19
Payer: COMMERCIAL

## 2025-06-19 DIAGNOSIS — Z13.220 SCREENING FOR LIPID DISORDERS: ICD-10-CM

## 2025-06-19 DIAGNOSIS — Z00.6 ENCOUNTER FOR EXAMINATION FOR NORMAL COMPARISON OR CONTROL IN CLINICAL RESEARCH PROGRAM: ICD-10-CM

## 2025-06-19 LAB
CHOLEST SERPL-MCNC: 134 MG/DL (ref ?–200)
HDLC SERPL-MCNC: 54 MG/DL
LDLC SERPL CALC-MCNC: 69 MG/DL (ref 0–100)
TRIGL SERPL-MCNC: 55 MG/DL (ref ?–150)

## 2025-06-19 PROCEDURE — 36415 COLL VENOUS BLD VENIPUNCTURE: CPT

## 2025-06-19 PROCEDURE — 80061 LIPID PANEL: CPT

## 2025-06-27 LAB
APOB+LDLR+PCSK9 GENE MUT ANL BLD/T: NOT DETECTED
BRCA1+BRCA2 DEL+DUP + FULL MUT ANL BLD/T: NOT DETECTED
MLH1+MSH2+MSH6+PMS2 GN DEL+DUP+FUL M: NOT DETECTED